# Patient Record
Sex: MALE | Race: WHITE | NOT HISPANIC OR LATINO | URBAN - METROPOLITAN AREA
[De-identification: names, ages, dates, MRNs, and addresses within clinical notes are randomized per-mention and may not be internally consistent; named-entity substitution may affect disease eponyms.]

---

## 2017-01-20 ENCOUNTER — EMERGENCY (EMERGENCY)
Facility: HOSPITAL | Age: 82
LOS: 1 days | Discharge: HOME | End: 2017-01-20
Attending: EMERGENCY MEDICINE | Admitting: EMERGENCY MEDICINE
Payer: MEDICARE

## 2017-01-20 VITALS
TEMPERATURE: 98 F | HEART RATE: 84 BPM | WEIGHT: 143.3 LBS | RESPIRATION RATE: 19 BRPM | OXYGEN SATURATION: 99 % | HEIGHT: 63 IN | SYSTOLIC BLOOD PRESSURE: 136 MMHG | DIASTOLIC BLOOD PRESSURE: 62 MMHG

## 2017-01-20 DIAGNOSIS — Z98.890 OTHER SPECIFIED POSTPROCEDURAL STATES: Chronic | ICD-10-CM

## 2017-01-20 DIAGNOSIS — H11.32 CONJUNCTIVAL HEMORRHAGE, LEFT EYE: ICD-10-CM

## 2017-01-20 DIAGNOSIS — Z95.4 PRESENCE OF OTHER HEART-VALVE REPLACEMENT: ICD-10-CM

## 2017-01-20 DIAGNOSIS — I10 ESSENTIAL (PRIMARY) HYPERTENSION: ICD-10-CM

## 2017-01-20 DIAGNOSIS — G30.9 ALZHEIMER'S DISEASE, UNSPECIFIED: ICD-10-CM

## 2017-01-20 DIAGNOSIS — Z95.810 PRESENCE OF AUTOMATIC (IMPLANTABLE) CARDIAC DEFIBRILLATOR: ICD-10-CM

## 2017-01-20 DIAGNOSIS — I50.9 HEART FAILURE, UNSPECIFIED: ICD-10-CM

## 2017-01-20 DIAGNOSIS — Z95.1 PRESENCE OF AORTOCORONARY BYPASS GRAFT: ICD-10-CM

## 2017-01-20 DIAGNOSIS — M31.6 OTHER GIANT CELL ARTERITIS: ICD-10-CM

## 2017-01-20 DIAGNOSIS — Z95.810 PRESENCE OF AUTOMATIC (IMPLANTABLE) CARDIAC DEFIBRILLATOR: Chronic | ICD-10-CM

## 2017-01-20 DIAGNOSIS — F02.80 DEMENTIA IN OTHER DISEASES CLASSIFIED ELSEWHERE, UNSPECIFIED SEVERITY, WITHOUT BEHAVIORAL DISTURBANCE, PSYCHOTIC DISTURBANCE, MOOD DISTURBANCE, AND ANXIETY: ICD-10-CM

## 2017-01-20 DIAGNOSIS — H57.8 OTHER SPECIFIED DISORDERS OF EYE AND ADNEXA: ICD-10-CM

## 2017-01-20 DIAGNOSIS — Z79.02 LONG TERM (CURRENT) USE OF ANTITHROMBOTICS/ANTIPLATELETS: ICD-10-CM

## 2017-01-20 DIAGNOSIS — Z95.2 PRESENCE OF PROSTHETIC HEART VALVE: Chronic | ICD-10-CM

## 2017-01-20 PROCEDURE — 99282 EMERGENCY DEPT VISIT SF MDM: CPT

## 2017-01-20 NOTE — ED ADULT NURSE NOTE - PMH
CHF (congestive heart failure)    HTN (hypertension) CHF (congestive heart failure)    Dementia    HTN (hypertension)    Temporal arteritis

## 2017-01-20 NOTE — ED ADULT NURSE NOTE - OBJECTIVE STATEMENT
Pt A&Ox4, ambulatory to ED c/o redness to left eye.  Per pt's wife, he had sudden onset of blood in left eye tonight.  Pt denies visual changes or pain.

## 2017-01-20 NOTE — ED PROVIDER NOTE - CHPI ED SYMPTOMS NEG
no eye lid swelling/no discharge/no photophobia/no foreign body/no purulent drainage/no drainage/no pain/no blurred vision/no double vision

## 2017-01-20 NOTE — ED ADULT NURSE NOTE - PSH
Aortic valve replaced    Cardiac defibrillator in place    H/O hernia repair    History of open heart surgery  triple bypass  History of prostate surgery

## 2017-01-20 NOTE — ED PROVIDER NOTE - OBJECTIVE STATEMENT
84 year Pmh of Dementia, alzheimers,HTN, temporal arteritis comes to the ER for painless red left eye. Patient cannot remember any trauma, sneezing or coughing but his wife noticed that his left eye was blood shot earlier today. No loss of vision, blurring of vision or any other complaints. Has not been on any blood thinners in the last 2 weeks.

## 2017-01-20 NOTE — ED PROVIDER NOTE - EYES, MLM
Conjunctiva red and swollen, no iritis noted, no blood in iris, occular pressure of 15 measured on tonopen.

## 2017-01-20 NOTE — ED ADULT NURSE NOTE - CHPI ED SYMPTOMS NEG
no vomiting/no loss of consciousness/no change in level of consciousness/no blurred vision/no syncope

## 2017-01-26 ENCOUNTER — EMERGENCY (EMERGENCY)
Facility: HOSPITAL | Age: 82
LOS: 1 days | Discharge: HOME | End: 2017-01-26
Attending: EMERGENCY MEDICINE | Admitting: EMERGENCY MEDICINE
Payer: MEDICARE

## 2017-01-26 VITALS
WEIGHT: 164.02 LBS | TEMPERATURE: 98 F | RESPIRATION RATE: 16 BRPM | DIASTOLIC BLOOD PRESSURE: 78 MMHG | OXYGEN SATURATION: 98 % | SYSTOLIC BLOOD PRESSURE: 160 MMHG | HEART RATE: 84 BPM

## 2017-01-26 VITALS
HEART RATE: 86 BPM | RESPIRATION RATE: 18 BRPM | OXYGEN SATURATION: 96 % | DIASTOLIC BLOOD PRESSURE: 70 MMHG | TEMPERATURE: 98 F | SYSTOLIC BLOOD PRESSURE: 125 MMHG

## 2017-01-26 DIAGNOSIS — I50.9 HEART FAILURE, UNSPECIFIED: ICD-10-CM

## 2017-01-26 DIAGNOSIS — Z98.890 OTHER SPECIFIED POSTPROCEDURAL STATES: Chronic | ICD-10-CM

## 2017-01-26 DIAGNOSIS — F03.90 UNSPECIFIED DEMENTIA, UNSPECIFIED SEVERITY, WITHOUT BEHAVIORAL DISTURBANCE, PSYCHOTIC DISTURBANCE, MOOD DISTURBANCE, AND ANXIETY: ICD-10-CM

## 2017-01-26 DIAGNOSIS — Z79.01 LONG TERM (CURRENT) USE OF ANTICOAGULANTS: ICD-10-CM

## 2017-01-26 DIAGNOSIS — Z95.2 PRESENCE OF PROSTHETIC HEART VALVE: Chronic | ICD-10-CM

## 2017-01-26 DIAGNOSIS — M12.9 ARTHROPATHY, UNSPECIFIED: ICD-10-CM

## 2017-01-26 DIAGNOSIS — Z98.890 OTHER SPECIFIED POSTPROCEDURAL STATES: ICD-10-CM

## 2017-01-26 DIAGNOSIS — I10 ESSENTIAL (PRIMARY) HYPERTENSION: ICD-10-CM

## 2017-01-26 DIAGNOSIS — Z95.810 PRESENCE OF AUTOMATIC (IMPLANTABLE) CARDIAC DEFIBRILLATOR: Chronic | ICD-10-CM

## 2017-01-26 PROCEDURE — 73110 X-RAY EXAM OF WRIST: CPT

## 2017-01-26 PROCEDURE — 73130 X-RAY EXAM OF HAND: CPT

## 2017-01-26 PROCEDURE — 96372 THER/PROPH/DIAG INJ SC/IM: CPT

## 2017-01-26 PROCEDURE — 99283 EMERGENCY DEPT VISIT LOW MDM: CPT | Mod: 25

## 2017-01-26 PROCEDURE — 99283 EMERGENCY DEPT VISIT LOW MDM: CPT

## 2017-01-26 PROCEDURE — 73110 X-RAY EXAM OF WRIST: CPT | Mod: 26,LT

## 2017-01-26 PROCEDURE — 73130 X-RAY EXAM OF HAND: CPT | Mod: 26,LT

## 2017-01-26 RX ORDER — MELOXICAM 15 MG/1
1 TABLET ORAL
Qty: 15 | Refills: 0 | OUTPATIENT
Start: 2017-01-26 | End: 2017-02-10

## 2017-01-26 RX ORDER — KETOROLAC TROMETHAMINE 30 MG/ML
15 SYRINGE (ML) INJECTION ONCE
Qty: 0 | Refills: 0 | Status: DISCONTINUED | OUTPATIENT
Start: 2017-01-26 | End: 2017-01-26

## 2017-01-26 RX ORDER — ACETAMINOPHEN 500 MG
650 TABLET ORAL ONCE
Qty: 0 | Refills: 0 | Status: COMPLETED | OUTPATIENT
Start: 2017-01-26 | End: 2017-01-26

## 2017-01-26 RX ADMIN — Medication 650 MILLIGRAM(S): at 17:19

## 2017-01-26 RX ADMIN — Medication 15 MILLIGRAM(S): at 18:35

## 2017-01-26 NOTE — ED PROVIDER NOTE - OBJECTIVE STATEMENT
Patient came into the ED with family [place] [facility] [place]. left-hand dominant. no known trauma. Patient has dementia, so forgets things but wife states when he falls he calls her for help, so low suspicion of fall.

## 2017-01-26 NOTE — ED PROVIDER NOTE - CONSTITUTIONAL, MLM
normal... Well appearing, well nourished, awake, alert, mild confusion at baseline. and in no apparent distress.

## 2017-01-26 NOTE — ED PROVIDER NOTE - LOCATION
wrist/+swelling left dorsum hand and wrist. +tenderness all metacarpals. FROM hand and wrist with pain. +radial pulse. nontender arm/elbow/shoulder./hand

## 2017-01-26 NOTE — ED PROVIDER NOTE - EYES, MLM
Clear bilaterally, pupils equal, round and reactive to light. left eye with medial chemosis (old and improving). EOMI.

## 2017-01-26 NOTE — ED ADULT NURSE NOTE - OBJECTIVE STATEMENT
Pt is C/O pain to left hand. Pt has Hx dementia, denies recent fall. Family states pt usually has to marcella for help getting up after falling, no recent fall that she is aware of

## 2017-01-30 PROBLEM — I10 ESSENTIAL (PRIMARY) HYPERTENSION: Chronic | Status: ACTIVE | Noted: 2017-01-20

## 2017-01-30 PROBLEM — F03.90 UNSPECIFIED DEMENTIA, UNSPECIFIED SEVERITY, WITHOUT BEHAVIORAL DISTURBANCE, PSYCHOTIC DISTURBANCE, MOOD DISTURBANCE, AND ANXIETY: Chronic | Status: ACTIVE | Noted: 2017-01-20

## 2017-01-30 PROBLEM — M31.6 OTHER GIANT CELL ARTERITIS: Chronic | Status: ACTIVE | Noted: 2017-01-20

## 2017-01-30 PROBLEM — I50.9 HEART FAILURE, UNSPECIFIED: Chronic | Status: ACTIVE | Noted: 2017-01-20

## 2018-01-01 ENCOUNTER — INPATIENT (INPATIENT)
Facility: HOSPITAL | Age: 83
LOS: 1 days | Discharge: HOME | DRG: 176 | End: 2018-01-03
Attending: INTERNAL MEDICINE | Admitting: INTERNAL MEDICINE
Payer: MEDICARE

## 2018-01-01 VITALS
TEMPERATURE: 97 F | HEART RATE: 97 BPM | OXYGEN SATURATION: 97 % | HEIGHT: 65 IN | DIASTOLIC BLOOD PRESSURE: 73 MMHG | WEIGHT: 134.92 LBS | RESPIRATION RATE: 16 BRPM | SYSTOLIC BLOOD PRESSURE: 153 MMHG

## 2018-01-01 DIAGNOSIS — R10.9 UNSPECIFIED ABDOMINAL PAIN: ICD-10-CM

## 2018-01-01 DIAGNOSIS — Z98.890 OTHER SPECIFIED POSTPROCEDURAL STATES: Chronic | ICD-10-CM

## 2018-01-01 DIAGNOSIS — I10 ESSENTIAL (PRIMARY) HYPERTENSION: ICD-10-CM

## 2018-01-01 DIAGNOSIS — F41.9 ANXIETY DISORDER, UNSPECIFIED: ICD-10-CM

## 2018-01-01 DIAGNOSIS — Z95.2 PRESENCE OF PROSTHETIC HEART VALVE: Chronic | ICD-10-CM

## 2018-01-01 DIAGNOSIS — I25.10 ATHEROSCLEROTIC HEART DISEASE OF NATIVE CORONARY ARTERY WITHOUT ANGINA PECTORIS: ICD-10-CM

## 2018-01-01 DIAGNOSIS — Z95.810 PRESENCE OF AUTOMATIC (IMPLANTABLE) CARDIAC DEFIBRILLATOR: Chronic | ICD-10-CM

## 2018-01-01 DIAGNOSIS — H26.9 UNSPECIFIED CATARACT: Chronic | ICD-10-CM

## 2018-01-01 DIAGNOSIS — I26.99 OTHER PULMONARY EMBOLISM WITHOUT ACUTE COR PULMONALE: ICD-10-CM

## 2018-01-01 DIAGNOSIS — Z29.9 ENCOUNTER FOR PROPHYLACTIC MEASURES, UNSPECIFIED: ICD-10-CM

## 2018-01-01 DIAGNOSIS — I50.9 HEART FAILURE, UNSPECIFIED: ICD-10-CM

## 2018-01-01 DIAGNOSIS — F03.90 UNSPECIFIED DEMENTIA WITHOUT BEHAVIORAL DISTURBANCE: ICD-10-CM

## 2018-01-01 LAB
ALBUMIN SERPL ELPH-MCNC: 3.2 G/DL — LOW (ref 3.3–5)
ALP SERPL-CCNC: 106 U/L — SIGNIFICANT CHANGE UP (ref 40–120)
ALT FLD-CCNC: 15 U/L — SIGNIFICANT CHANGE UP (ref 12–78)
AMYLASE P1 CFR SERPL: 65 U/L — SIGNIFICANT CHANGE UP (ref 25–115)
ANION GAP SERPL CALC-SCNC: 8 MMOL/L — SIGNIFICANT CHANGE UP (ref 5–17)
APTT BLD: 30.7 SEC — SIGNIFICANT CHANGE UP (ref 27.5–37.4)
AST SERPL-CCNC: 20 U/L — SIGNIFICANT CHANGE UP (ref 15–37)
BASOPHILS # BLD AUTO: 0.1 K/UL — SIGNIFICANT CHANGE UP (ref 0–0.2)
BASOPHILS NFR BLD AUTO: 1.2 % — SIGNIFICANT CHANGE UP (ref 0–2)
BILIRUB SERPL-MCNC: 1.1 MG/DL — SIGNIFICANT CHANGE UP (ref 0.2–1.2)
BUN SERPL-MCNC: 13 MG/DL — SIGNIFICANT CHANGE UP (ref 7–23)
CALCIUM SERPL-MCNC: 8.3 MG/DL — LOW (ref 8.5–10.1)
CHLORIDE SERPL-SCNC: 107 MMOL/L — SIGNIFICANT CHANGE UP (ref 96–108)
CK MB BLD-MCNC: 1.2 % — SIGNIFICANT CHANGE UP (ref 0–3.5)
CK MB BLD-MCNC: 1.5 % — SIGNIFICANT CHANGE UP (ref 0–3.5)
CK MB CFR SERPL CALC: 0.9 NG/ML — SIGNIFICANT CHANGE UP (ref 0–3.6)
CK MB CFR SERPL CALC: 0.9 NG/ML — SIGNIFICANT CHANGE UP (ref 0–3.6)
CK SERPL-CCNC: 59 U/L — SIGNIFICANT CHANGE UP (ref 26–308)
CK SERPL-CCNC: 75 U/L — SIGNIFICANT CHANGE UP (ref 26–308)
CO2 SERPL-SCNC: 28 MMOL/L — SIGNIFICANT CHANGE UP (ref 22–31)
CREAT SERPL-MCNC: 1.2 MG/DL — SIGNIFICANT CHANGE UP (ref 0.5–1.3)
EOSINOPHIL # BLD AUTO: 0 K/UL — SIGNIFICANT CHANGE UP (ref 0–0.5)
EOSINOPHIL NFR BLD AUTO: 0.2 % — SIGNIFICANT CHANGE UP (ref 0–6)
GLUCOSE SERPL-MCNC: 113 MG/DL — HIGH (ref 70–99)
HCT VFR BLD CALC: 36.7 % — LOW (ref 39–50)
HGB BLD-MCNC: 11.9 G/DL — LOW (ref 13–17)
INR BLD: 1.16 RATIO — SIGNIFICANT CHANGE UP (ref 0.88–1.16)
LIDOCAIN IGE QN: 94 U/L — SIGNIFICANT CHANGE UP (ref 73–393)
LYMPHOCYTES # BLD AUTO: 0.5 K/UL — LOW (ref 1–3.3)
LYMPHOCYTES # BLD AUTO: 6.7 % — LOW (ref 13–44)
MCHC RBC-ENTMCNC: 28.9 PG — SIGNIFICANT CHANGE UP (ref 27–34)
MCHC RBC-ENTMCNC: 32.3 GM/DL — SIGNIFICANT CHANGE UP (ref 32–36)
MCV RBC AUTO: 89.5 FL — SIGNIFICANT CHANGE UP (ref 80–100)
MONOCYTES # BLD AUTO: 0.9 K/UL — SIGNIFICANT CHANGE UP (ref 0–0.9)
MONOCYTES NFR BLD AUTO: 12.6 % — HIGH (ref 1–9)
NEUTROPHILS # BLD AUTO: 5.5 K/UL — SIGNIFICANT CHANGE UP (ref 1.8–7.4)
NEUTROPHILS NFR BLD AUTO: 79.2 % — HIGH (ref 43–77)
PLATELET # BLD AUTO: 122 K/UL — LOW (ref 150–400)
POTASSIUM SERPL-MCNC: 3.8 MMOL/L — SIGNIFICANT CHANGE UP (ref 3.5–5.3)
POTASSIUM SERPL-SCNC: 3.8 MMOL/L — SIGNIFICANT CHANGE UP (ref 3.5–5.3)
PROT SERPL-MCNC: 6.8 G/DL — SIGNIFICANT CHANGE UP (ref 6–8.3)
PROTHROM AB SERPL-ACNC: 12.7 SEC — SIGNIFICANT CHANGE UP (ref 9.8–12.7)
RBC # BLD: 4.1 M/UL — LOW (ref 4.2–5.8)
RBC # FLD: 13.6 % — SIGNIFICANT CHANGE UP (ref 10.3–14.5)
SODIUM SERPL-SCNC: 143 MMOL/L — SIGNIFICANT CHANGE UP (ref 135–145)
TROPONIN I SERPL-MCNC: 0.06 NG/ML — HIGH (ref 0.01–0.04)
TROPONIN I SERPL-MCNC: 0.09 NG/ML — HIGH (ref 0.01–0.04)
WBC # BLD: 7 K/UL — SIGNIFICANT CHANGE UP (ref 3.8–10.5)
WBC # FLD AUTO: 7 K/UL — SIGNIFICANT CHANGE UP (ref 3.8–10.5)

## 2018-01-01 PROCEDURE — 99223 1ST HOSP IP/OBS HIGH 75: CPT

## 2018-01-01 PROCEDURE — 74177 CT ABD & PELVIS W/CONTRAST: CPT | Mod: 26

## 2018-01-01 PROCEDURE — 93970 EXTREMITY STUDY: CPT | Mod: 26

## 2018-01-01 PROCEDURE — 71275 CT ANGIOGRAPHY CHEST: CPT | Mod: 26

## 2018-01-01 PROCEDURE — 74019 RADEX ABDOMEN 2 VIEWS: CPT | Mod: 26

## 2018-01-01 PROCEDURE — 93010 ELECTROCARDIOGRAM REPORT: CPT

## 2018-01-01 PROCEDURE — 71045 X-RAY EXAM CHEST 1 VIEW: CPT | Mod: 26

## 2018-01-01 PROCEDURE — 99285 EMERGENCY DEPT VISIT HI MDM: CPT

## 2018-01-01 RX ORDER — OXYBUTYNIN CHLORIDE 5 MG
10 TABLET ORAL DAILY
Qty: 0 | Refills: 0 | Status: DISCONTINUED | OUTPATIENT
Start: 2018-01-01 | End: 2018-01-03

## 2018-01-01 RX ORDER — IOHEXOL 300 MG/ML
30 INJECTION, SOLUTION INTRAVENOUS ONCE
Qty: 0 | Refills: 0 | Status: COMPLETED | OUTPATIENT
Start: 2018-01-01 | End: 2018-01-01

## 2018-01-01 RX ORDER — ASPIRIN/CALCIUM CARB/MAGNESIUM 324 MG
81 TABLET ORAL DAILY
Qty: 0 | Refills: 0 | Status: DISCONTINUED | OUTPATIENT
Start: 2018-01-01 | End: 2018-01-01

## 2018-01-01 RX ORDER — GABAPENTIN 400 MG/1
0 CAPSULE ORAL
Qty: 0 | Refills: 0 | COMMUNITY

## 2018-01-01 RX ORDER — DONEPEZIL HYDROCHLORIDE 10 MG/1
10 TABLET, FILM COATED ORAL AT BEDTIME
Qty: 0 | Refills: 0 | Status: DISCONTINUED | OUTPATIENT
Start: 2018-01-01 | End: 2018-01-03

## 2018-01-01 RX ORDER — SODIUM CHLORIDE 9 MG/ML
1000 INJECTION INTRAMUSCULAR; INTRAVENOUS; SUBCUTANEOUS ONCE
Qty: 0 | Refills: 0 | Status: COMPLETED | OUTPATIENT
Start: 2018-01-01 | End: 2018-01-01

## 2018-01-01 RX ORDER — ATENOLOL 25 MG/1
25 TABLET ORAL
Qty: 0 | Refills: 0 | Status: DISCONTINUED | OUTPATIENT
Start: 2018-01-01 | End: 2018-01-02

## 2018-01-01 RX ORDER — MORPHINE SULFATE 50 MG/1
2 CAPSULE, EXTENDED RELEASE ORAL ONCE
Qty: 0 | Refills: 0 | Status: DISCONTINUED | OUTPATIENT
Start: 2018-01-01 | End: 2018-01-01

## 2018-01-01 RX ORDER — SERTRALINE 25 MG/1
100 TABLET, FILM COATED ORAL DAILY
Qty: 0 | Refills: 0 | Status: DISCONTINUED | OUTPATIENT
Start: 2018-01-01 | End: 2018-01-03

## 2018-01-01 RX ORDER — FUROSEMIDE 40 MG
40 TABLET ORAL DAILY
Qty: 0 | Refills: 0 | Status: DISCONTINUED | OUTPATIENT
Start: 2018-01-01 | End: 2018-01-02

## 2018-01-01 RX ORDER — ISOSORBIDE DINITRATE 5 MG/1
0 TABLET ORAL
Qty: 0 | Refills: 0 | COMMUNITY

## 2018-01-01 RX ORDER — ASPIRIN/CALCIUM CARB/MAGNESIUM 324 MG
81 TABLET ORAL DAILY
Qty: 0 | Refills: 0 | Status: DISCONTINUED | OUTPATIENT
Start: 2018-01-01 | End: 2018-01-03

## 2018-01-01 RX ORDER — ONDANSETRON 8 MG/1
4 TABLET, FILM COATED ORAL ONCE
Qty: 0 | Refills: 0 | Status: COMPLETED | OUTPATIENT
Start: 2018-01-01 | End: 2018-01-01

## 2018-01-01 RX ORDER — DONEPEZIL HYDROCHLORIDE 10 MG/1
0 TABLET, FILM COATED ORAL
Qty: 0 | Refills: 0 | COMMUNITY

## 2018-01-01 RX ORDER — MEMANTINE HYDROCHLORIDE 10 MG/1
0 TABLET ORAL
Qty: 0 | Refills: 0 | COMMUNITY

## 2018-01-01 RX ORDER — ATORVASTATIN CALCIUM 80 MG/1
40 TABLET, FILM COATED ORAL AT BEDTIME
Qty: 0 | Refills: 0 | Status: DISCONTINUED | OUTPATIENT
Start: 2018-01-01 | End: 2018-01-03

## 2018-01-01 RX ORDER — ATENOLOL 25 MG/1
0 TABLET ORAL
Qty: 0 | Refills: 0 | COMMUNITY

## 2018-01-01 RX ORDER — CLOPIDOGREL BISULFATE 75 MG/1
1 TABLET, FILM COATED ORAL
Qty: 0 | Refills: 0 | COMMUNITY

## 2018-01-01 RX ORDER — ISOSORBIDE MONONITRATE 60 MG/1
120 TABLET, EXTENDED RELEASE ORAL
Qty: 0 | Refills: 0 | Status: DISCONTINUED | OUTPATIENT
Start: 2018-01-01 | End: 2018-01-02

## 2018-01-01 RX ORDER — POTASSIUM CHLORIDE 20 MEQ
0 PACKET (EA) ORAL
Qty: 0 | Refills: 0 | COMMUNITY

## 2018-01-01 RX ORDER — SERTRALINE 25 MG/1
0 TABLET, FILM COATED ORAL
Qty: 0 | Refills: 0 | COMMUNITY

## 2018-01-01 RX ORDER — ASPIRIN/CALCIUM CARB/MAGNESIUM 324 MG
325 TABLET ORAL ONCE
Qty: 0 | Refills: 0 | Status: COMPLETED | OUTPATIENT
Start: 2018-01-01 | End: 2018-01-01

## 2018-01-01 RX ORDER — ENOXAPARIN SODIUM 100 MG/ML
60 INJECTION SUBCUTANEOUS
Qty: 0 | Refills: 0 | Status: DISCONTINUED | OUTPATIENT
Start: 2018-01-02 | End: 2018-01-03

## 2018-01-01 RX ORDER — POTASSIUM CHLORIDE 20 MEQ
20 PACKET (EA) ORAL DAILY
Qty: 0 | Refills: 0 | Status: DISCONTINUED | OUTPATIENT
Start: 2018-01-01 | End: 2018-01-03

## 2018-01-01 RX ORDER — CHOLECALCIFEROL (VITAMIN D3) 125 MCG
1000 CAPSULE ORAL DAILY
Qty: 0 | Refills: 0 | Status: DISCONTINUED | OUTPATIENT
Start: 2018-01-01 | End: 2018-01-03

## 2018-01-01 RX ORDER — ENOXAPARIN SODIUM 100 MG/ML
60 INJECTION SUBCUTANEOUS ONCE
Qty: 0 | Refills: 0 | Status: COMPLETED | OUTPATIENT
Start: 2018-01-01 | End: 2018-01-01

## 2018-01-01 RX ORDER — ATORVASTATIN CALCIUM 80 MG/1
0 TABLET, FILM COATED ORAL
Qty: 0 | Refills: 0 | COMMUNITY

## 2018-01-01 RX ORDER — EZETIMIBE 10 MG/1
0 TABLET ORAL
Qty: 0 | Refills: 0 | COMMUNITY

## 2018-01-01 RX ORDER — FUROSEMIDE 40 MG
0 TABLET ORAL
Qty: 0 | Refills: 0 | COMMUNITY

## 2018-01-01 RX ADMIN — ONDANSETRON 4 MILLIGRAM(S): 8 TABLET, FILM COATED ORAL at 11:48

## 2018-01-01 RX ADMIN — Medication 325 MILLIGRAM(S): at 13:06

## 2018-01-01 RX ADMIN — SODIUM CHLORIDE 1000 MILLILITER(S): 9 INJECTION INTRAMUSCULAR; INTRAVENOUS; SUBCUTANEOUS at 11:49

## 2018-01-01 RX ADMIN — MORPHINE SULFATE 2 MILLIGRAM(S): 50 CAPSULE, EXTENDED RELEASE ORAL at 11:48

## 2018-01-01 RX ADMIN — ENOXAPARIN SODIUM 60 MILLIGRAM(S): 100 INJECTION SUBCUTANEOUS at 16:09

## 2018-01-01 RX ADMIN — MORPHINE SULFATE 2 MILLIGRAM(S): 50 CAPSULE, EXTENDED RELEASE ORAL at 11:57

## 2018-01-01 RX ADMIN — IOHEXOL 30 MILLILITER(S): 300 INJECTION, SOLUTION INTRAVENOUS at 11:48

## 2018-01-01 NOTE — CONSULT NOTE ADULT - ASSESSMENT
This is an 85M with PMH of CAD s/p CABGx 1 v in 2009, mm PCI procedures, CHF (EF?), ICD,  HTN, aortic stenosis s/p bioprosthetic aortic valve replacement, carotid disease s/p b/l CEA, dementia presents with RUQ pain since this morning at 2 AM, incidentally found to have RLL PE:    - Admit to telemetry  - Trend cardiac enzymes  - Needs 2 D  echo to assess RV and LV function  - Full dose Lovenox at 60 BID  - Continue aspirin 81 qd  - D/c Brillinta  - Continue atenolol 25 BID  - Lipitor 40 hs  - Continue Lasix 40 qd  - Continue Imdur 120 qd  - Monitor and replete lytes  - Supplemental oxygen as needed  - WOrk up of abdominal pain per primary team  - To follow

## 2018-01-01 NOTE — H&P ADULT - HISTORY OF PRESENT ILLNESS
85M with PMH of presents to ED for abdominal pain    In ED, vitals stable. Labs significant for H/H 11.9/36.7. D dimer 664. Troponin elevated at 0.062. CXR negative. Xray abdomen shows no obstruction or free air. CTA chest and CT abdomen/pelvis shows RLL PE and cholelithiasis. Given asa and dose of full dose lovenox. 85M with PMH of CAD s/p CABG, CHF (EF?), HTN, aortic stenosis s/p replacement, dementia presents to ED for abdominal pain since this morning at 2 AM. History given by family who is at bedside as pt is poor historian.     In ED, vitals stable. Labs significant for H/H 11.9/36.7. D dimer 664. Troponin elevated at 0.062. CXR negative. Xray abdomen shows no obstruction or free air. CTA chest and CT abdomen/pelvis shows RLL PE and cholelithiasis. Given asa and started on full dose lovenox. LE dopplers pending. to ED for abdominal pain since this morning at 2 AM. History given by family who is at bedside as pt is poor historian.     In ED, vitals stable. Labs significant for H/H 11.9/36.7. D dimer 664. Troponin elevated at 0.062. EKG shows sinus with 1 degree av block. CXR negative. Xray abdomen shows no obstruction or free air. CTA chest and CT abdomen/pelvis shows RLL PE and cholelithiasis. Given asa and started on full dose lovenox. LE dopplers pending. 85M with PMH of CAD s/p CABG, CHF (EF?), HTN, aortic stenosis s/p replacement, dementia presents with RUQ pain since this morning at 2 AM. History given by family who is at bedside as pt is poor historian. States that pt was experience intermittent stabbing pain in RUQ that caused him to wince in pain. No radiation of pain to back or sides. Pt states he also had chest pain when taking deep breaths. Denies fever, chills, SOB, n/v/d.     In ED, vitals stable. Labs significant for H/H 11.9/36.7. D dimer 664. Troponin elevated at 0.062. EKG shows sinus with 1 degree av block. CXR negative. Xray abdomen shows no obstruction or free air. CTA chest and CT abdomen/pelvis shows RLL PE and cholelithiasis. Given asa and started on full dose lovenox. LE dopplers pending. 85M with PMH of CAD s/p CABG, CHF (EF?), HTN, aortic stenosis s/p replacement, dementia presents with RUQ pain since this morning at 2 AM. History given by family who is at bedside as pt is poor historian. States that pt was experience intermittent stabbing pain in RUQ that caused him to wince in pain. No radiation of pain to back or sides. Pt states he also had chest pain when taking deep breaths. Denies fever, chills, SOB, n/v/d. Of note, pt family states that pt has not been taking his meds for a long time. Only takes lasix for swelling in legs.     In ED, vitals stable. Labs significant for H/H 11.9/36.7. D dimer 664. Troponin elevated at 0.062. EKG shows sinus with 1 degree av block. CXR negative. Xray abdomen shows no obstruction or free air. CTA chest and CT abdomen/pelvis shows RLL PE and cholelithiasis. Given asa and started on full dose lovenox. LE dopplers pending.

## 2018-01-01 NOTE — H&P ADULT - NSHPSOCIALHISTORY_GEN_ALL_CORE
Marital Status:  Lives with:   Occupation:     Tobacco Use:   Alcohol Use:  Substance Use:    Immunization Hx  [  ] Flu shot                          Date:   [  ] Pneumonia shot           Date:  [  ] Tetanus                          Date:    [  ] Advanced Directives: [   ] declined   [  ] health care proxy: Marital Status:   Lives with: Spouse  Occupation: Retired    Tobacco Use: Former cigar smoker   Alcohol Use: Social drinker  Substance Use: Denies    Immunization Hx  [ X ] Flu shot                     Date: 2017  [ X ] Pneumonia shot         Date: in last 5 yrs  [  ] Tetanus                       Date:    [  ] Advanced Directives: [   ] declined   [  ] health care proxy:

## 2018-01-01 NOTE — H&P ADULT - NSHPPHYSICALEXAM_GEN_ALL_CORE
Vital Signs Last 24 Hrs  T(C): 36.1 (01 Jan 2018 10:58), Max: 36.1 (01 Jan 2018 10:58)  T(F): 97 (01 Jan 2018 10:58), Max: 97 (01 Jan 2018 10:58)  HR: 82 (01 Jan 2018 15:00) (82 - 97)  BP: 130/75 (01 Jan 2018 15:00) (130/75 - 153/73)  RR: 22 (01 Jan 2018 15:00) (16 - 22)  SpO2: 98% (01 Jan 2018 15:00) (97% - 98%) Vital Signs Last 24 Hrs  T(C): 36.1 (01 Jan 2018 10:58), Max: 36.1 (01 Jan 2018 10:58)  T(F): 97 (01 Jan 2018 10:58), Max: 97 (01 Jan 2018 10:58)  HR: 82 (01 Jan 2018 15:00) (82 - 97)  BP: 130/75 (01 Jan 2018 15:00) (130/75 - 153/73)  RR: 22 (01 Jan 2018 15:00) (16 - 22)  SpO2: 98% (01 Jan 2018 15:00) (97% - 98%)    PHYSICAL EXAM:  GENERAL: NAD, well-developed, well nourished  HEAD:  NC/AC  EYES: EOMI, conjunctiva and sclera clear  NECK: Supple, No JVD  CHEST/LUNG: CTAB. No cough, wheeze, rales.   HEART: Regular rate and rhythm. No murmurs, rubs, or gallops.   ABDOMEN: Soft, Nontender, Nondistended. Bowel sounds present  EXTREMITIES:  2+ Peripheral Pulses, No clubbing, cyanosis, or edema  PSYCH: AAOx3  NEUROLOGY: non-focal  SKIN: No rashes or lesions

## 2018-01-01 NOTE — ED PROVIDER NOTE - PROGRESS NOTE DETAILS
spoke with Dr Gabriel, results discussed, troponin .062, case discussed, will be in to see patient as per Dr Gabriel, 2nd set of enzymes in 4 hours, informed D dimer was added. as per radiologist Dr Thapa, rll pe, scarring vs opacities rll, gall stones.  spoke with Dr Perlman, case discussed, results discussed, will admit patient, tele, advised call admitting resident.  spoke with admitting resident will be in to see patient.  wife and patient made aware.

## 2018-01-01 NOTE — H&P ADULT - PMH
CHF (congestive heart failure)    Dementia    HTN (hypertension)    Temporal arteritis Anxiety    Aortic stenosis    CAD (coronary artery disease)    CHF (congestive heart failure)    Dementia    HTN (hypertension)    Temporal arteritis

## 2018-01-01 NOTE — H&P ADULT - NSHPREVIEWOFSYSTEMS_GEN_ALL_CORE
CONSTITUTIONAL:  No fever, chills,  weight loss, weakness or fatigue.  HEENT:  Eyes:  No visual loss, blurred vision, diplopia or yellow sclerae.   Ears, Nose, Throat:  No hearing loss, congestion, runny nose or dysphagia.  CARDIOVASCULAR:  No chest pain, chest pressure or chest discomfort. No palpitations or edema.  RESPIRATORY:  No dyspnea, cough, congestion, wheeze.   GASTROINTESTINAL:  No abdominal pain, nausea, vomiting or diarrhea.   GENITOURINARY:  No dysuria, urinary frequency or hematuria.   NEUROLOGICAL:  No headache, dizziness, syncope, paralysis, ataxia, numbness or tingling in the extremities. No change in bowel or bladder control.  MUSCULOSKELETAL:  No muscle, back pain, joint pain or stiffness.  SKIN: No itch, rash, lesions.   HEMATOLOGIC:  No anemia, bleeding or bruising.  LYMPHATICS:  No enlarged nodes. No history of splenectomy.  PSYCHIATRIC:  No history of depression or anxiety.  ENDOCRINOLOGIC:  No reports of sweating, cold or heat intolerance. No polyuria or polydipsia. CONSTITUTIONAL:  No fever, chills, weight loss, weakness or fatigue.  HEENT:  Eyes:  No visual loss, blurred vision, diplopia or yellow sclerae.   Ears, Nose, Throat:  No hearing loss, congestion, runny nose or dysphagia.  CARDIOVASCULAR: Chest pain with deep breath. No palpitations or edema.  RESPIRATORY:  No dyspnea, cough, congestion, wheeze.   GASTROINTESTINAL: Abdominal pain. No nausea, vomiting or diarrhea.   GENITOURINARY:  No dysuria, urinary frequency or hematuria.   NEUROLOGICAL:  No headache, dizziness, syncope, paralysis, ataxia, numbness or tingling in the extremities.  MUSCULOSKELETAL:  No muscle, back pain, joint pain or stiffness.  SKIN: No itch, rash, lesions.   HEMATOLOGIC:  No anemia, bleeding or bruising.

## 2018-01-01 NOTE — H&P ADULT - ASSESSMENT
85M with PMH of CAD s/p CABG, CHF (EF?), HTN, aortic stenosis s/p replacement, dementia presents with RUQ pain and admitted for RLL PE.

## 2018-01-01 NOTE — ED PROVIDER NOTE - OBJECTIVE STATEMENT
86 yo male brought to ed by wife presents with sharp abdominal pain that began suddenly last night,  worse with deep breathing, wife states patient has mild dementia. states no fever, no chills no vomiting.  wife does not remember if he has his gallbladder or appendix.  wife states his urine output and bowel movements are normal.  lizeth has hx of defibrillator, chf, htn.  PMd Dr Muir 84 yo male brought to ed by wife presents with sharp abdominal pain that began suddenly last night,  worse with deep breathing, wife states patient has mild dementia. states no fever, no chills no vomiting.  wife does not remember if he has his gallbladder or appendix.  wife states his urine output and bowel movements are normal, last bowel movement yesterday, did not have anything to eat today.  cyndit has hx of defibrillator, chf, htn.   PMd Dr Muir

## 2018-01-01 NOTE — ED PROVIDER NOTE - CARE PLAN
Principal Discharge DX:	Abdominal pain, unspecified abdominal location Principal Discharge DX:	Elevated troponin  Secondary Diagnosis:	Abdominal pain, unspecified abdominal location Principal Discharge DX:	Pulmonary embolism  Secondary Diagnosis:	Abdominal pain, unspecified abdominal location

## 2018-01-01 NOTE — H&P ADULT - PROBLEM SELECTOR PLAN 8
IMPROVE VTE Individual Risk Assessment          RISK                                                          Points  [  ] Previous VTE                                                3  [  ] Thrombophilia                                            2  [  ] Lower limb paralysis                                  2        (unable to hold up >15 seconds)    [  ] Current Cancer                                            2         (within 6 months)  [  ] Immobilization > 24 hrs                             1  [  ] ICU/CCU stay > 24 hours                           1  [ X ] Age > 60                                                        1    IMPROVE VTE Score: 1    VTE ppx: On full dose lovenox for PE

## 2018-01-01 NOTE — H&P ADULT - PSH
Aortic valve replaced    Cardiac defibrillator in place    H/O hernia repair    History of open heart surgery  triple bypass  History of prostate surgery Aortic valve replaced    Bilateral cataracts    Cardiac defibrillator in place    H/O hernia repair    History of open heart surgery  triple bypass  History of prostate surgery

## 2018-01-01 NOTE — ED ADULT NURSE NOTE - HARM RISK FACTORS
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 Pt reports that she was just discharged 2 days ago from [facility] (from the ER?) with pt with same presentation with pt receiving systemic steroids and was discharged. Pt notes a globus sensation and is afraid of choking with these episodes. Pt is concerned when examiner corroborated the pt's hx and med list w/ pt's spouse that her spouse would ascribe everything to anxiety. Pt is requesting all medications to be IV--including benzodiazepine. No suicidal or homicidal ideation. No anhedonia, but pt anxious.
yes

## 2018-01-01 NOTE — ED PROVIDER NOTE - ATTENDING CONTRIBUTION TO CARE
Sharp LUQ abdominal pain in this elderly male. Exam revealed elderly male in mild distress with soft but slightly tender LUQ of abdomen and clear lungs and normal heart sounds. I agree with plan and management outlined by PA

## 2018-01-01 NOTE — CONSULT NOTE ADULT - SUBJECTIVE AND OBJECTIVE BOX
[facility] - Nadeen Sneed, Dayana, Violeta, Harvey, Curtis Giron  Office Number: [phone]    Initial Consult Note    CHIEF COMPLAINT: Patient is a 85y old  Male who presents with a chief complaint of Abdominal pain (01 Jan 2018 16:19)      HPI:  This is an 85M with PMH of CAD s/p CABGx 1 v in 2009, mm PCI procedures, CHF (EF?), ICD,  HTN, aortic stenosis s/p bioprosthetic aortic valve replacement, carotid disease s/p b/l CEA, dementia presents with RUQ pain since this morning at 2 AM. History given by family who is at bedside as pt is poor historian. States that pt was experience intermittent stabbing pain in RUQ that caused him to wince in pain. No radiation of pain to back or sides. Pt states he also had chest pain when taking deep breaths. Denies fever, chills, SOB, n/v/d.     In ED, vitals stable. Labs significant for H/H 11.9/36.7. D dimer 664. Troponin elevated at 0.062. EKG shows sinus with 1 degree av block. CXR negative. Xray abdomen shows no obstruction or free air. CTA chest and CT abdomen/pelvis shows RLL PE and cholelithiasis. Given asa and started on full dose lovenox. LE dopplers pending. (01 Jan 2018 16:19)      PAST MEDICAL & SURGICAL HISTORY:  Aortic stenosis  CAD (coronary artery disease)  Anxiety  Temporal arteritis  Dementia  CHF (congestive heart failure)  HTN (hypertension)  Bilateral cataracts  History of prostate surgery  Aortic valve replaced  Cardiac defibrillator in place  H/O hernia repair  History of open heart surgery: triple bypass      SOCIAL HISTORY:  No tobacco, ethanol, or drug abuse.    FAMILY HISTORY:  No pertinent family history in first degree relatives    No family history of acute MI or sudden cardiac death.    MEDICATIONS  (STANDING):  aspirin  chewable 81 milliGRAM(s) Oral daily  ATENolol  Tablet 25 milliGRAM(s) Oral two times a day  atorvastatin 40 milliGRAM(s) Oral at bedtime  cholecalciferol 1000 Unit(s) Oral daily  donepezil 10 milliGRAM(s) Oral at bedtime  enoxaparin Injectable 60 milliGRAM(s) SubCutaneous two times a day  furosemide    Tablet 40 milliGRAM(s) Oral daily  isosorbide   mononitrate ER Tablet (IMDUR) 120 milliGRAM(s) Oral <User Schedule>  oxybutynin 10 milliGRAM(s) Oral daily  potassium chloride    Tablet ER 20 milliEquivalent(s) Oral daily  sertraline 100 milliGRAM(s) Oral daily        Allergies    No Known Allergies        REVIEW OF SYSTEMS:    All other review of systems is negative unless indicated above    VITAL SIGNS:   Vital Signs Last 24 Hrs  T(C): 36.6 (01 Jan 2018 15:00), Max: 36.6 (01 Jan 2018 15:00)  T(F): 97.8 (01 Jan 2018 15:00), Max: 97.8 (01 Jan 2018 15:00)  HR: 82 (01 Jan 2018 15:00) (82 - 97)  BP: 130/75 (01 Jan 2018 15:00) (130/75 - 153/73)  BP(mean): --  RR: 22 (01 Jan 2018 15:00) (16 - 22)  SpO2: 98% (01 Jan 2018 15:00) (97% - 98%)    I&O's Summary      On Exam:    Constitutional: NAD, alert and awake, but confused  Lungs:  Non-labored, breath sounds are clear bilaterally, No wheezing, rales or rhonchi  Cardiovascular: RRR.  S1 and S2 positive.  1/6 systolic murmur  Gastrointestinal: Bowel Sounds present, soft, nontender.   Lymph: minimal LE peripheral edema. No cervical lymphadenopathy.  Neurological: Alert, no focal deficits  Skin: No rashes or ulcers   Psych:  Mood & affect appropriate.    LABS: All Labs Reviewed:                        11.9   7.0   )-----------( 122      ( 01 Jan 2018 11:52 )             36.7     01 Jan 2018 12:21    143    |  107    |  13     ----------------------------<  113    3.8     |  28     |  1.20     Ca    8.3        01 Jan 2018 12:21    TPro  6.8    /  Alb  3.2    /  TBili  1.1    /  DBili  x      /  AST  20     /  ALT  15     /  AlkPhos  106    01 Jan 2018 12:21    PT/INR - ( 01 Jan 2018 11:52 )   PT: 12.7 sec;   INR: 1.16 ratio         PTT - ( 01 Jan 2018 11:52 )  PTT:30.7 sec  CARDIAC MARKERS ( 01 Jan 2018 12:21 )  .062 ng/mL / x     / 59 U/L / x     / 0.9 ng/mL      Blood Culture:         RADIOLOGY:    < from: CT Abdomen and Pelvis w/ Oral Cont and w/ IV Cont (01.01.18 @ 15:01) >    EXAM:  CT ABDOMEN AND PELVIS OC IC                          EXAM:  CT ANGIO CHEST (W)AW IC                            PROCEDURE DATE:  01/01/2018          INTERPRETATION:  CLINICAL INFORMATION: Chest pain, abdominal pain    COMPARISON: None    PROCEDURE:   CT Angiography of the Chest was performed followed by portal venous phase   imaging of the Abdomen and Pelvis.  95 ml of Omnipaque 350 was injected intravenously. 5 ml were discarded.  Sagittal and coronal reformats were performed as well as 3D (MIP)   reconstructions.    FINDINGS:    CHEST:     LUNGS AND LARGE AIRWAYS: Patent central airways. Right lower lobe   opacities. Bilateral dependent atelectasis.  PLEURA: Trace right pleural effusion.  VESSELS: Atherosclerotic changes of thoracicaorta and coronary arteries.   Pulmonary arterial filling defects identified within the right lower lobe.  HEART: Heart size is normal. Aortic valve prosthesis. No pericardial   effusion.  MEDIASTINUM AND EMMY: No lymphadenopathy.  CHEST WALL AND LOWER NECK: Status post sternotomy. Left anterior chest   wall cardiac conduction device with lead to the heart.    ABDOMEN AND PELVIS:    LIVER: Within normal limits.  BILE DUCTS: Normal caliber.  GALLBLADDER: Cholelithiasis.  SPLEEN: Within normal limits.  PANCREAS: Atrophic.  ADRENALS: Mild thickening.  KIDNEYS/URETERS: Subcentimeter hypodense renal lesions, too small to   further characterize. Renal cysts measuring up to 2.2 cm.    BLADDER: Within normal limits.  REPRODUCTIVE ORGANS: The prostateis enlarged.    BOWEL: No bowel obstruction. Appendix not visualized. Colonic   diverticulosis.  PERITONEUM: No ascites.  VESSELS:  Atherosclerotic changes.  RETROPERITONEUM: No lymphadenopathy.    ABDOMINAL WALL: Tiny fat-containing umbilical hernia.  BONES: Shoulder, hip, and spine degenerative changes.    IMPRESSION:     Positive for pulmonary emboli in the right lower lobe.  Right lower lobe opacities, which may represent infarct and/or pneumonia.  Cholelithiasis.    Findings were discussed with Dr. Martinez by telephone on 1/1/2018 at   1520 hours.                HIMA SOLIS M.D., ATTENDING RADIOLOGIST  This document has been electronically signed. Jan 1 2018  3:23PM              < end of copied text >      EKG:  SInus rhythm.  1st degree AVB.  NSST changes.

## 2018-01-01 NOTE — H&P ADULT - PROBLEM SELECTOR PLAN 1
CTA shows RLL PE.   Admit to telemetry  - Full dose lovenox   - F/u LE dopplers  - Cardio consulted (Dr. Monahan) CTA shows RLL PE.   Admit to telemetry  - Full dose lovenox   - F/u LE dopplers  - Trend CE   - Cardio consulted (Dr. Monahan)  - speech and swallow eval as pt trouble swallowing liquids at times CTA shows RLL PE.   Admit to telemetry  - Full dose lovenox 60mg BID  - F/u LE dopplers  - Trend CE   - Cardio consulted (Dr. Monahan)  - speech and swallow eval as pt trouble swallowing liquids at times

## 2018-01-02 LAB
ANION GAP SERPL CALC-SCNC: 9 MMOL/L — SIGNIFICANT CHANGE UP (ref 5–17)
BUN SERPL-MCNC: 11 MG/DL — SIGNIFICANT CHANGE UP (ref 7–23)
CALCIUM SERPL-MCNC: 8.5 MG/DL — SIGNIFICANT CHANGE UP (ref 8.5–10.1)
CHLORIDE SERPL-SCNC: 103 MMOL/L — SIGNIFICANT CHANGE UP (ref 96–108)
CK MB BLD-MCNC: 1 % — SIGNIFICANT CHANGE UP (ref 0–3.5)
CK MB CFR SERPL CALC: 1.4 NG/ML — SIGNIFICANT CHANGE UP (ref 0–3.6)
CK SERPL-CCNC: 140 U/L — SIGNIFICANT CHANGE UP (ref 26–308)
CO2 SERPL-SCNC: 28 MMOL/L — SIGNIFICANT CHANGE UP (ref 22–31)
CREAT SERPL-MCNC: 1.1 MG/DL — SIGNIFICANT CHANGE UP (ref 0.5–1.3)
GLUCOSE SERPL-MCNC: 104 MG/DL — HIGH (ref 70–99)
HCT VFR BLD CALC: 38.3 % — LOW (ref 39–50)
HGB BLD-MCNC: 11.9 G/DL — LOW (ref 13–17)
MCHC RBC-ENTMCNC: 27.9 PG — SIGNIFICANT CHANGE UP (ref 27–34)
MCHC RBC-ENTMCNC: 31.1 GM/DL — LOW (ref 32–36)
MCV RBC AUTO: 89.6 FL — SIGNIFICANT CHANGE UP (ref 80–100)
PLATELET # BLD AUTO: 119 K/UL — LOW (ref 150–400)
POTASSIUM SERPL-MCNC: 3.9 MMOL/L — SIGNIFICANT CHANGE UP (ref 3.5–5.3)
POTASSIUM SERPL-SCNC: 3.9 MMOL/L — SIGNIFICANT CHANGE UP (ref 3.5–5.3)
RBC # BLD: 4.27 M/UL — SIGNIFICANT CHANGE UP (ref 4.2–5.8)
RBC # FLD: 13.6 % — SIGNIFICANT CHANGE UP (ref 10.3–14.5)
SODIUM SERPL-SCNC: 140 MMOL/L — SIGNIFICANT CHANGE UP (ref 135–145)
TROPONIN I SERPL-MCNC: 0.1 NG/ML — HIGH (ref 0.01–0.04)
WBC # BLD: 7.7 K/UL — SIGNIFICANT CHANGE UP (ref 3.8–10.5)
WBC # FLD AUTO: 7.7 K/UL — SIGNIFICANT CHANGE UP (ref 3.8–10.5)

## 2018-01-02 PROCEDURE — 93306 TTE W/DOPPLER COMPLETE: CPT | Mod: 26

## 2018-01-02 PROCEDURE — 99233 SBSQ HOSP IP/OBS HIGH 50: CPT

## 2018-01-02 RX ORDER — SODIUM CHLORIDE 9 MG/ML
1000 INJECTION INTRAMUSCULAR; INTRAVENOUS; SUBCUTANEOUS
Qty: 0 | Refills: 0 | Status: DISCONTINUED | OUTPATIENT
Start: 2018-01-02 | End: 2018-01-03

## 2018-01-02 RX ORDER — ISOSORBIDE MONONITRATE 60 MG/1
120 TABLET, EXTENDED RELEASE ORAL DAILY
Qty: 0 | Refills: 0 | Status: DISCONTINUED | OUTPATIENT
Start: 2018-01-02 | End: 2018-01-02

## 2018-01-02 RX ORDER — FAMOTIDINE 10 MG/ML
20 INJECTION INTRAVENOUS DAILY
Qty: 0 | Refills: 0 | Status: DISCONTINUED | OUTPATIENT
Start: 2018-01-02 | End: 2018-01-03

## 2018-01-02 RX ORDER — ONDANSETRON 8 MG/1
4 TABLET, FILM COATED ORAL EVERY 8 HOURS
Qty: 0 | Refills: 0 | Status: DISCONTINUED | OUTPATIENT
Start: 2018-01-02 | End: 2018-01-03

## 2018-01-02 RX ORDER — SODIUM CHLORIDE 9 MG/ML
250 INJECTION INTRAMUSCULAR; INTRAVENOUS; SUBCUTANEOUS ONCE
Qty: 0 | Refills: 0 | Status: COMPLETED | OUTPATIENT
Start: 2018-01-02 | End: 2018-01-02

## 2018-01-02 RX ADMIN — Medication 40 MILLIGRAM(S): at 05:42

## 2018-01-02 RX ADMIN — SODIUM CHLORIDE 50 MILLILITER(S): 9 INJECTION INTRAMUSCULAR; INTRAVENOUS; SUBCUTANEOUS at 20:29

## 2018-01-02 RX ADMIN — SERTRALINE 100 MILLIGRAM(S): 25 TABLET, FILM COATED ORAL at 14:26

## 2018-01-02 RX ADMIN — ISOSORBIDE MONONITRATE 120 MILLIGRAM(S): 60 TABLET, EXTENDED RELEASE ORAL at 08:40

## 2018-01-02 RX ADMIN — Medication 1000 UNIT(S): at 14:26

## 2018-01-02 RX ADMIN — ONDANSETRON 4 MILLIGRAM(S): 8 TABLET, FILM COATED ORAL at 18:03

## 2018-01-02 RX ADMIN — ENOXAPARIN SODIUM 60 MILLIGRAM(S): 100 INJECTION SUBCUTANEOUS at 17:31

## 2018-01-02 RX ADMIN — DONEPEZIL HYDROCHLORIDE 10 MILLIGRAM(S): 10 TABLET, FILM COATED ORAL at 21:22

## 2018-01-02 RX ADMIN — ATORVASTATIN CALCIUM 40 MILLIGRAM(S): 80 TABLET, FILM COATED ORAL at 21:22

## 2018-01-02 RX ADMIN — SODIUM CHLORIDE 500 MILLILITER(S): 9 INJECTION INTRAMUSCULAR; INTRAVENOUS; SUBCUTANEOUS at 18:03

## 2018-01-02 RX ADMIN — FAMOTIDINE 20 MILLIGRAM(S): 10 INJECTION INTRAVENOUS at 14:26

## 2018-01-02 RX ADMIN — Medication 81 MILLIGRAM(S): at 14:26

## 2018-01-02 RX ADMIN — Medication 10 MILLIGRAM(S): at 14:26

## 2018-01-02 RX ADMIN — ENOXAPARIN SODIUM 60 MILLIGRAM(S): 100 INJECTION SUBCUTANEOUS at 04:00

## 2018-01-02 RX ADMIN — ATENOLOL 25 MILLIGRAM(S): 25 TABLET ORAL at 05:42

## 2018-01-02 RX ADMIN — Medication 20 MILLIEQUIVALENT(S): at 14:26

## 2018-01-02 NOTE — PROGRESS NOTE ADULT - SUBJECTIVE AND OBJECTIVE BOX
[facility] -- Nadeen Sneed, Dayana, Violeta, Mack Gabriel Savella  Office # [phone]      Follow Up:  PE    Subjective/Observations: Patient seen and examined. Events noted. Resting comfortably in bed. No complaints of chest pain, dyspnea, or palpitations reported. No signs of orthopnea or PND.       REVIEW OF SYSTEMS: Limited 2/2 comorbidities     PAST MEDICAL & SURGICAL HISTORY:  Aortic stenosis  CAD (coronary artery disease)  Anxiety  Temporal arteritis  Dementia  CHF (congestive heart failure)  HTN (hypertension)  Bilateral cataracts  History of prostate surgery  Aortic valve replaced  Cardiac defibrillator in place  H/O hernia repair  History of open heart surgery: triple bypass      MEDICATIONS  (STANDING):  aspirin  chewable 81 milliGRAM(s) Oral daily  ATENolol  Tablet 25 milliGRAM(s) Oral two times a day  atorvastatin 40 milliGRAM(s) Oral at bedtime  cholecalciferol 1000 Unit(s) Oral daily  donepezil 10 milliGRAM(s) Oral at bedtime  enoxaparin Injectable 60 milliGRAM(s) SubCutaneous two times a day  furosemide    Tablet 40 milliGRAM(s) Oral daily  isosorbide   mononitrate ER Tablet (IMDUR) 120 milliGRAM(s) Oral <User Schedule>  oxybutynin 10 milliGRAM(s) Oral daily  potassium chloride    Tablet ER 20 milliEquivalent(s) Oral daily  sertraline 100 milliGRAM(s) Oral daily    MEDICATIONS  (PRN):      Allergies    No Known Allergies    Intolerances            Vital Signs Last 24 Hrs  T(C): 36.7 (02 Jan 2018 01:13), Max: 36.7 (01 Jan 2018 19:30)  T(F): 98.1 (02 Jan 2018 01:13), Max: 98.1 (02 Jan 2018 01:13)  HR: 77 (02 Jan 2018 01:13) (77 - 97)  BP: 128/89 (02 Jan 2018 01:13) (128/89 - 153/73)  BP(mean): --  RR: 18 (02 Jan 2018 01:13) (16 - 22)  SpO2: 100% (02 Jan 2018 01:13) (97% - 100%)    I&O's Summary    Weight (kg): 61.2 (01-01 @ 10:58)    PHYSICAL EXAM:  TELE: SR  Constitutional: NAD, awake and alert   HEENT: Moist Mucous Membranes, Anicteric  Pulmonary: Decreased breath sounds b/l. No rales, crackles or wheeze appreciated.    Cardiovascular: Regular, S1 and S2, No murmurs, rubs, gallops or clicks  Gastrointestinal: Bowel Sounds present, soft, nontender.   Lymph: No peripheral edema. No lymphadenopathy.  Skin: No visible rashes or ulcers.  Psych:  Mood & affect appropriate    LABS: All Labs Reviewed:                        11.9   7.7   )-----------( 119      ( 02 Jan 2018 05:52 )             38.3                         11.9   7.0   )-----------( 122      ( 01 Jan 2018 11:52 )             36.7     02 Jan 2018 05:52    140    |  103    |  11     ----------------------------<  104    3.9     |  28     |  1.10   01 Jan 2018 12:21    143    |  107    |  13     ----------------------------<  113    3.8     |  28     |  1.20     Ca    8.5        02 Jan 2018 05:52  Ca    8.3        01 Jan 2018 12:21    TPro  6.8    /  Alb  3.2    /  TBili  1.1    /  DBili  x      /  AST  20     /  ALT  15     /  AlkPhos  106    01 Jan 2018 12:21    PT/INR - ( 01 Jan 2018 11:52 )   PT: 12.7 sec;   INR: 1.16 ratio         PTT - ( 01 Jan 2018 11:52 )  PTT:30.7 sec  CARDIAC MARKERS ( 02 Jan 2018 05:52 )  .098 ng/mL / x     / 140 U/L / x     / 1.4 ng/mL  CARDIAC MARKERS ( 01 Jan 2018 21:04 )  .086 ng/mL / x     / 75 U/L / x     / 0.9 ng/mL  CARDIAC MARKERS ( 01 Jan 2018 12:21 )  .062 ng/mL / x     / 59 U/L / x     / 0.9 ng/mL

## 2018-01-02 NOTE — PROGRESS NOTE ADULT - ASSESSMENT
This is an 85M with PMH of CAD s/p CABGx 1 v in 2009, mm PCI procedures, CHF (EF?), ICD,  HTN, aortic stenosis s/p bioprosthetic aortic valve replacement, carotid disease s/p b/l CEA, dementia presents with RUQ pain since this morning at 2 AM, incidentally found to have RLL PE:    - Monitor telemetry  - CE not c/w ACS  - Needs 2 D  echo to assess RV and LV function  - Full dose Lovenox at 60 BID  - Continue aspirin 81 qd and keep off of Brillinta  - Continue atenolol 25 BID  - Lipitor 40 hs  - Continue Lasix 40 qd. Appear euvolemic  - Continue Imdur 120 qd  - Monitor and replete lytes  - Supplemental oxygen as needed  - WOrk up of abdominal pain per primary team which he is not complaining of at this time.   - Further cardiac workup will depend on clinical course.   - All other workup per primary team. Will followup. This is an 85M with PMH of CAD s/p CABGx 1 v in 2009, mm PCI procedures, CHF (EF?), ICD,  HTN, aortic stenosis s/p bioprosthetic aortic valve replacement, carotid disease s/p b/l CEA, dementia presents with RUQ pain since this morning at 2 AM, incidentally found to have RLL PE:    - Monitor telemetry  - CE not c/w ACS  - Needs 2 D  echo to assess RV and LV function  - Full dose Lovenox at 60 BID....will likely need to be transitioned to a NOAC  - Continue aspirin 81 qd and keep off of Brillinta  - Continue atenolol 25 BID  - Lipitor 40 hs  - Continue Lasix 40 qd. Appear euvolemic  - Continue Imdur 120 qd  - Monitor and replete lytes  - Supplemental oxygen as needed  - WOrk up of abdominal pain per primary team which he is not complaining of at this time.   - Further cardiac workup will depend on clinical course.   - All other workup per primary team. Will followup.

## 2018-01-02 NOTE — PROGRESS NOTE ADULT - ASSESSMENT
85M with PMH of CAD s/p CABG, CHF, HTN, aortic stenosis s/p replacement, dementia presents with RUQ pain and admitted for RLL PE.

## 2018-01-02 NOTE — CONSULT NOTE ADULT - SUBJECTIVE AND OBJECTIVE BOX
Date/Time Patient Seen:  		  Referring MD:   Data Reviewed	       Patient is a 85y old  Male who presents with a chief complaint of Abdominal pain (01 Jan 2018 16:19)      Subjective/HPI  new PE RLL  seen and examined  CT reviewed  LE dopplers NEG for DVT     lives at home  verbal  alert  medical hx reviewed         PAST MEDICAL & SURGICAL HISTORY:  Aortic stenosis  CAD (coronary artery disease)  Anxiety  Temporal arteritis  Dementia  CHF (congestive heart failure)  HTN (hypertension)  Bilateral cataracts  History of prostate surgery  Aortic valve replaced  Cardiac defibrillator in place  H/O hernia repair  History of open heart surgery: triple bypass        Medication list         MEDICATIONS  (STANDING):  aspirin  chewable 81 milliGRAM(s) Oral daily  ATENolol  Tablet 25 milliGRAM(s) Oral two times a day  atorvastatin 40 milliGRAM(s) Oral at bedtime  cholecalciferol 1000 Unit(s) Oral daily  donepezil 10 milliGRAM(s) Oral at bedtime  enoxaparin Injectable 60 milliGRAM(s) SubCutaneous two times a day  famotidine    Tablet 20 milliGRAM(s) Oral daily  furosemide    Tablet 40 milliGRAM(s) Oral daily  isosorbide   mononitrate ER Tablet (IMDUR) 120 milliGRAM(s) Oral <User Schedule>  oxybutynin 10 milliGRAM(s) Oral daily  potassium chloride    Tablet ER 20 milliEquivalent(s) Oral daily  sertraline 100 milliGRAM(s) Oral daily    MEDICATIONS  (PRN):         Vitals log        ICU Vital Signs Last 24 Hrs  T(C): 36.7 (02 Jan 2018 10:00), Max: 36.7 (01 Jan 2018 19:30)  T(F): 98 (02 Jan 2018 10:00), Max: 98.1 (02 Jan 2018 01:13)  HR: 62 (02 Jan 2018 10:00) (62 - 88)  BP: 110/54 (02 Jan 2018 10:00) (110/54 - 141/80)  BP(mean): --  ABP: --  ABP(mean): --  RR: 16 (02 Jan 2018 10:00) (16 - 22)  SpO2: 94% (02 Jan 2018 10:00) (94% - 100%)           Input and Output:  I&O's Detail      Lab Data                        11.9   7.7   )-----------( 119      ( 02 Jan 2018 05:52 )             38.3     01-02    140  |  103  |  11  ----------------------------<  104<H>  3.9   |  28  |  1.10    Ca    8.5      02 Jan 2018 05:52    TPro  6.8  /  Alb  3.2<L>  /  TBili  1.1  /  DBili  x   /  AST  20  /  ALT  15  /  AlkPhos  106  01-01      CARDIAC MARKERS ( 02 Jan 2018 05:52 )  .098 ng/mL / x     / 140 U/L / x     / 1.4 ng/mL  CARDIAC MARKERS ( 01 Jan 2018 21:04 )  .086 ng/mL / x     / 75 U/L / x     / 0.9 ng/mL  CARDIAC MARKERS ( 01 Jan 2018 12:21 )  .062 ng/mL / x     / 59 U/L / x     / 0.9 ng/mL        Review of Systems	      Objective     Physical Examination    head at  heart s1s2  lungs dec BS  abd soft  cn grossly int  extr no gross edema      Pertinent Lab findings & Imaging      Monse:  NO   Adequate UO     I&O's Detail           Discussed with:     Cultures:	        Radiology      RLL pe  Atelectasis

## 2018-01-02 NOTE — SWALLOW BEDSIDE ASSESSMENT ADULT - COMMENTS
85M with PMH of CAD s/p CABG, CHF , HTN, aortic stenosis s/p replacement, dementia presents with RUQ pain   pt awake, alert, confused, poor historian  delayed ,weak mastication c solids. pt c clear breath sounds pre/post deglutition via cervical auscultation

## 2018-01-02 NOTE — GOALS OF CARE CONVERSATION - PERSONAL ADVANCE DIRECTIVE - CONVERSATION DETAILS
met pt w his LEROY Owusu, provided hcp form for chart. discussed pt directives. pt has spoken to SO prior, at present pt wants resuscitation, does not want to have a trach or live on ventilator, simple lay terms. pt remains full code. contact # given.

## 2018-01-02 NOTE — PATIENT PROFILE ADULT. - FALL HARM RISK
age(85 years old or older)/bones(Osteoporosis,prev fx,steroid use,metastatic bone ca/coagulation(Bleeding disorder R/T clinical cond/anti-coags)

## 2018-01-02 NOTE — PHYSICAL THERAPY INITIAL EVALUATION ADULT - ADDITIONAL COMMENTS
lives with spouse. has 4 rosi with railing  amb with hha of spouse in house and has rw for community use

## 2018-01-02 NOTE — CONSULT NOTE ADULT - PROBLEM SELECTOR RECOMMENDATION 2
PE on CTA chest  LE dopplers neg  monitor sat and VS and HD  out of bed and ambulate as tolerated  on tele monitor  will ask pall care eval for MOLST discussion  will transition to NOAC prior to discharge  overall prognosis guarded, unclear etiology of PE, will need age appropriate cancer screening  does not appear to have resp distress or HD instability

## 2018-01-02 NOTE — CONSULT NOTE ADULT - SUBJECTIVE AND OBJECTIVE BOX
All records reviewed.    HPI:  85M with hx  CAD s/p CABG, CHF, HTN, aortic stenosis s/p replacement, dementia presents with RUQ abdomen pain since 2 AM day of admission.  CT angio chest, abdomen/pelvis sig for RLL pulm embolus and cholelithiasis  Wilfrid legs dopplers no DVT  Wife reports pt chronically not very active, remains sedantary  No family hx of DVT/PE, pt's father  of MI    PAST MEDICAL & SURGICAL HISTORY:  Aortic stenosis  CAD (coronary artery disease)  Anxiety  Temporal arteritis  Dementia  CHF (congestive heart failure)  HTN (hypertension)  Bilateral cataracts  History of prostate surgery  Aortic valve replaced  Cardiac defibrillator in place  H/O hernia repair  History of open heart surgery: triple bypass      Review of System:  no longer w abdomen RUQ pain since Lovenox  no weight loss/fever/anorexia/SOB/CP/incr rodolfo edema    MEDICATIONS  (STANDING):  aspirin  chewable 81 milliGRAM(s) Oral daily  ATENolol  Tablet 25 milliGRAM(s) Oral two times a day  atorvastatin 40 milliGRAM(s) Oral at bedtime  cholecalciferol 1000 Unit(s) Oral daily  donepezil 10 milliGRAM(s) Oral at bedtime  enoxaparin Injectable 60 milliGRAM(s) SubCutaneous two times a day  famotidine    Tablet 20 milliGRAM(s) Oral daily  furosemide    Tablet 40 milliGRAM(s) Oral daily  isosorbide   mononitrate ER Tablet (IMDUR) 120 milliGRAM(s) Oral <User Schedule>  oxybutynin 10 milliGRAM(s) Oral daily  potassium chloride    Tablet ER 20 milliEquivalent(s) Oral daily  sertraline 100 milliGRAM(s) Oral daily    MEDICATIONS  (PRN):      Allergies    No Known Allergies    Intolerances        SOCIAL HISTORY:  occ cigars in past, stopped many years ago(can't be more specific)  no Etoh    FAMILY HISTORY:  father  of MI      Vital Signs Last 24 Hrs  T(C): 36.9 (2018 12:53), Max: 36.9 (2018 12:53)  T(F): 98.5 (2018 12:53), Max: 98.5 (2018 12:53)  HR: 59 (2018 12:53) (59 - 82)  BP: 110/54 (2018 10:00) (110/54 - 141/80)  BP(mean): --  RR: 16 (2018 10:00) (16 - 22)  SpO2: 94% (2018 10:00) (94% - 100%)    PHYSICAL EXAM:      General:well developed well nourished, in no acute distress, sl confused  Eyes:sclera anicteric, pupils equal and reactive to light  ENMT:buccal mucosa moist, pharynx not injected  Neck:supple, trachea midline  Lungs:clear, no wheeze/rhonchi  Cardiovascular:regular rate and rhythm, S1 S2  Abdomen:soft, nontender, no organomegaly present, bowel sounds normal  Neurological:alert and oriented x3, Cranial Nerves II-XII grossly intact  Skin:no increased ecchymosis/petechiae/purpura  Lymph Nodes:no palpable cervical/supraclavicular lymph nodes enlargements  Extremities:no cyanosis/clubbing/edema        LABS:                        11.9   7.7   )-----------( 119      ( 2018 05:52 )             38.3      @ 05:52  WBC7.7  RBC4.27 Hgb11.9 Hct38.3  MCV89.6  [id number]  Auto Neutro-- Band-- Auto Lymph-- Atypical Lymph-- Reactive Lymph-- Auto Mono-- Auto Eos-- Auto Baso--         @ 11:52  WBC7.0  RBC4.10 Hgb11.9 Hct36.7  MCV89.5  [id number]  Auto Ktqwsh43.2 Band-- Auto Lymph6.7 Atypical Lymph-- Reactive Lymph-- Auto Mono12.6 Auto Eos0.2 Auto Baso1.2              140  |  103  |  11  ----------------------------<  104<H>  3.9   |  28  |  1.10    Ca    8.5      2018 05:52    TPro  6.8  /  Alb  3.2<L>  /  TBili  1.1  /  DBili  x   /  AST  20  /  ALT  15  /  AlkPhos  106   @ 11:52  PT12.7 INR1.16  PTT30.7        PERIPHERAL BLOOD SMEAR REVIEW:      RADIOLOGY & ADDITIONAL STUDIES:  < from: CT Angio Chest w/ IV Cont (18 @ 15:04) >  EXAM:  CT ABDOMEN AND PELVIS OC IC                          EXAM:  CT ANGIO CHEST (W)AW IC                            PROCEDURE DATE:  2018          INTERPRETATION:  CLINICAL INFORMATION: Chest pain, abdominal pain    COMPARISON: None    PROCEDURE:   CT Angiography of the Chest was performed followed by portal venous phase   imaging of the Abdomen and Pelvis.  95 ml of Omnipaque 350 was injected intravenously. 5 ml were discarded.  Sagittal and coronal reformats were performed as well as 3D (MIP)   reconstructions.    FINDINGS:    CHEST:     LUNGS AND LARGE AIRWAYS: Patent central airways. Right lower lobe   opacities. Bilateral dependent atelectasis.  PLEURA: Trace right pleural effusion.  VESSELS: Atherosclerotic changes of thoracicaorta and coronary arteries.   Pulmonary arterial filling defects identified within the right lower lobe.  HEART: Heart size is normal. Aortic valve prosthesis. No pericardial   effusion.  MEDIASTINUM AND EMMY: No lymphadenopathy.  CHEST WALL AND LOWER NECK: Status post sternotomy. Left anterior chest   wall cardiac conduction device with lead to the heart.    ABDOMEN AND PELVIS:    LIVER: Within normal limits.  BILE DUCTS: Normal caliber.  GALLBLADDER: Cholelithiasis.  SPLEEN: Within normal limits.  PANCREAS: Atrophic.  ADRENALS: Mild thickening.  KIDNEYS/URETERS: Subcentimeter hypodense renal lesions, too small to   further characterize. Renal cysts measuring up to 2.2 cm.    BLADDER: Within normal limits.  REPRODUCTIVE ORGANS: The prostateis enlarged.    BOWEL: No bowel obstruction. Appendix not visualized. Colonic   diverticulosis.  PERITONEUM: No ascites.  VESSELS:  Atherosclerotic changes.  RETROPERITONEUM: No lymphadenopathy.    ABDOMINAL WALL: Tiny fat-containing umbilical hernia.  BONES: Shoulder, hip, and spine degenerative changes.    IMPRESSION:     Positive for pulmonary emboli in the right lower lobe.  Right lower lobe opacities, which may represent infarct and/or pneumonia.  Cholelithiasis.    Findings were discussed with Dr. Martinez by telephone on 2018 at   1520 hours.                HIMA SOLIS M.D., ATTENDING RADIOLOGIST  This document has been electronically signed. 2018  3:23PM       < end of copied text >  < from: CT Abdomen and Pelvis w/ Oral Cont and w/ IV Cont (18 @ 15:01) >  EXAM:  CT ABDOMEN AND PELVIS OC IC                          EXAM:  CT ANGIO CHEST (W)AW IC                            PROCEDURE DATE:  2018          INTERPRETATION:  CLINICAL INFORMATION: Chest pain, abdominal pain    COMPARISON: None    PROCEDURE:   CT Angiography of the Chest was performed followed by portal venous phase   imaging of the Abdomen and Pelvis.  95 ml of Omnipaque 350 was injected intravenously. 5 ml were discarded.  Sagittal and coronal reformats were performed as well as 3D (MIP)   reconstructions.    FINDINGS:    CHEST:     LUNGS AND LARGE AIRWAYS: Patent central airways. Right lower lobe   opacities. Bilateral dependent atelectasis.  PLEURA: Trace right pleural effusion.  VESSELS: Atherosclerotic changes of thoracicaorta and coronary arteries.   Pulmonary arterial filling defects identified within the right lower lobe.  HEART: Heart size is normal. Aortic valve prosthesis. No pericardial   effusion.  MEDIASTINUM AND EMMY: No lymphadenopathy.  CHEST WALL AND LOWER NECK: Status post sternotomy. Left anterior chest   wall cardiac conduction device with lead to the heart.    ABDOMEN AND PELVIS:    LIVER: Within normal limits.  BILE DUCTS: Normal caliber.  GALLBLADDER: Cholelithiasis.  SPLEEN: Within normal limits.  PANCREAS: Atrophic.  ADRENALS: Mild thickening.  KIDNEYS/URETERS: Subcentimeter hypodense renal lesions, too small to   further characterize. Renal cysts measuring up to 2.2 cm.    BLADDER: Within normal limits.  REPRODUCTIVE ORGANS: The prostateis enlarged.    BOWEL: No bowel obstruction. Appendix not visualized. Colonic   diverticulosis.  PERITONEUM: No ascites.  VESSELS:  Atherosclerotic changes.  RETROPERITONEUM: No lymphadenopathy.    ABDOMINAL WALL: Tiny fat-containing umbilical hernia.  BONES: Shoulder, hip, and spine degenerative changes.    IMPRESSION:     Positive for pulmonary emboli in the right lower lobe.  Right lower lobe opacities, which may represent infarct and/or pneumonia.  Cholelithiasis.    Findings were discussed with Dr. Martinez by telephone on 2018 at   1520 hours.      < end of copied text >  < from: US Duplex Venous Lower Ext Complete, Bilateral (18 @ 19:29) >  EXAM:  US DPLX LWR EXT VEINS COMPL BI                            PROCEDURE DATE:  2018          INTERPRETATION:  CLINICAL INFORMATION: Dyspnea pulmonary embolism    COMPARISON: None available.    TECHNIQUE: Duplex sonography of the BILATERAL LOWER extremities with   color and spectral Doppler, with and without compression.      FINDINGS:    There is normal compressibility of the bilateral common femoral, femoral   and popliteal veins. No calf vein thrombosis is detected.    Doppler examination shows normal spontaneous and phasic flow.    IMPRESSION:     No evidence of bilateral lower extremity deep venous thrombosis.                    ELVIRA LIN M.D., ATTENDING RADIOLOGIST  This document has been electronically signed. 2018  7:36PM        < end of copied text >

## 2018-01-02 NOTE — SWALLOW BEDSIDE ASSESSMENT ADULT - SWALLOW EVAL: RECOMMENDED FEEDING/EATING TECHNIQUES
alternate food with liquid/maintain upright posture during/after eating for 30 mins/no straws/crush medication (when feasible)/hard swallow w/ each bite or sip

## 2018-01-02 NOTE — PROGRESS NOTE ADULT - SUBJECTIVE AND OBJECTIVE BOX
Patient is a 85y old  Male who presents with a chief complaint of Abdominal pain (01 Jan 2018 16:19)    INTERVAL HPI/OVERNIGHT EVENTS: Patient was seen and examined at bedside this morning, in no distress and with no acute complaints. RUQ abdominal pain from yesterday is no longer present.     MEDICATIONS  (STANDING):  aspirin  chewable 81 milliGRAM(s) Oral daily  ATENolol  Tablet 25 milliGRAM(s) Oral two times a day  atorvastatin 40 milliGRAM(s) Oral at bedtime  cholecalciferol 1000 Unit(s) Oral daily  donepezil 10 milliGRAM(s) Oral at bedtime  enoxaparin Injectable 60 milliGRAM(s) SubCutaneous two times a day  famotidine    Tablet 20 milliGRAM(s) Oral daily  furosemide    Tablet 40 milliGRAM(s) Oral daily  isosorbide   mononitrate ER Tablet (IMDUR) 120 milliGRAM(s) Oral <User Schedule>  oxybutynin 10 milliGRAM(s) Oral daily  potassium chloride    Tablet ER 20 milliEquivalent(s) Oral daily  sertraline 100 milliGRAM(s) Oral daily    MEDICATIONS  (PRN):      Allergies    No Known Allergies    Intolerances        REVIEW OF SYSTEMS:  CONSTITUTIONAL: No fever, No chills,No fatigue,No myalgia,No Body ache  EYES: No eye pain, visual disturbances, or discharge  ENMT:  No ear pain, No nose bleed, No vertigo; No sinus or throat pain  NECK: No pain, No stiffness  RESPIRATORY: No cough, wheezing, No  hemoptysis; No shortness of breath  CARDIOVASCULAR: No chest pain, palpitations, leg swelling  GASTROINTESTINAL: No abdominal or epigastric pain. No nausea, No vomiting; No diarrhea or constipation. [ ] BM  GENITOURINARY: No dysuria, No frequency, No urgency, No hematuria, or incontinence  NEUROLOGICAL: alert and oriented x 3,  No headaches, No dizziness, No numbness,  SKIN:   No itching, burning, rashes, or lesions   MUSCULOSKELETAL: No joint pain or swelling; No muscle pain, No back pain, No extremity pain  PSYCHIATRIC: No depression, anxiety, mood swings, or difficulty sleeping  ROS  [ ] Unable to obtain   REST OF REVIEW Of SYSTEM - [ ] Normal     Height (cm): 165.1 (01-01 @ 10:58)  Weight (kg): 61.2 (01-01 @ 10:58)  BMI (kg/m2): 22.5 (01-01 @ 10:58)  BSA (m2): 1.67 (01-01 @ 10:58)  Vital Signs Last 24 Hrs  T(C): 36.7 (02 Jan 2018 10:00), Max: 36.7 (01 Jan 2018 19:30)  T(F): 98 (02 Jan 2018 10:00), Max: 98.1 (02 Jan 2018 01:13)  HR: 62 (02 Jan 2018 10:00) (62 - 88)  BP: 110/54 (02 Jan 2018 10:00) (110/54 - 141/80)  RR: 16 (02 Jan 2018 10:00) (16 - 22)  SpO2: 94% (02 Jan 2018 10:00) (94% - 100%)  [ ] room air   [x ] 02    PHYSICAL EXAM:  GENERAL:  No acute distresss,  [ ] Agitated, [ ] Lethargy, [ ] confused   HEAD:  normal  ENMT: normal  NECK:  normal    NERVOUS SYSTEM:  Alert & Oriented X2, [x]Confusion  [ ] Encephalopathic [ ] Sedated [ ] Other  CHEST/LUNG: Clear to auscultation bilaterally,  [ ] decreased breath sounds at bases  [ ] wheezing   [ ] rhonchi  [ ] crackles  HEART:  Regular rate and rhythm, No murmurs, rubs, or gallops,  [ ] irregular   ABDOMEN:  soft, nontender, nondistended, positive bowel sounds   [ ] obese  EXTREMITIES: No clubbing, or cyanosis. + trace edema b/l LE  SKIN: [ ] venous stasis skin changes    LABS:                        11.9   7.7   )-----------( 119      ( 02 Jan 2018 05:52 )             38.3     02 Jan 2018 05:52    140    |  103    |  11     ----------------------------<  104    3.9     |  28     |  1.10     Ca    8.5        02 Jan 2018 05:52    TPro  6.8    /  Alb  3.2    /  TBili  1.1    /  DBili  x      /  AST  20     /  ALT  15     /  AlkPhos  106    01 Jan 2018 12:21    PT/INR - ( 01 Jan 2018 11:52 )   PT: 12.7 sec;   INR: 1.16 ratio         PTT - ( 01 Jan 2018 11:52 )  PTT:30.7 sec    Care Discussed with [X] Consultants  [ ] Patient  [ ] Family  [X]   /   [ ] Other; RN  DVT prophylaxis [x] lovenox   [ ] subq heparin  [ ] coumadin  [ ] venodynes [ ] ambulating frequently at how risk for vte and no pharm         or  mechanical prophylaxis required    [ ] other   Advanced directive:    [ ]pt has hcp     [ ] pt declined to assign hcp  Discussed with pt @ bedside

## 2018-01-02 NOTE — CONSULT NOTE ADULT - ASSESSMENT
85M with hx  CAD s/p CABG, CHF, HTN, aortic stenosis s/p replacement, dementia presents with RUQ abdomen pain since 2 AM day of admission.  CT angio chest, abdomen/pelvis sig for RLL pulm embolus and cholelithiasis  Wilfrid legs dopplers no DVT  No prior or family hx of DVT/PE  Started on Lovenox  No longer w RUQ pain    Seen by Pulm. Workup to r/o right sided heart strain in progress.  Once stable, agree can change Lovenox to one of the new oral antithrombin/Factor X inhibitors anticoagulants ie Xarelto/Eliquis  No thrombophilia w/u in hospital necessary  Etiology ?due to decreased mobility  Ideally should be on indefinite anticoagulation but need to weigh against risk of bleed. Therefore at least 3months, and then will re-eval  Will f/u in office as outpatient.  Discussed w wife.    Thank you, please call if any questions.

## 2018-01-03 ENCOUNTER — TRANSCRIPTION ENCOUNTER (OUTPATIENT)
Age: 83
End: 2018-01-03

## 2018-01-03 VITALS — WEIGHT: 134.48 LBS

## 2018-01-03 DIAGNOSIS — N17.9 ACUTE KIDNEY FAILURE, UNSPECIFIED: ICD-10-CM

## 2018-01-03 LAB
ANION GAP SERPL CALC-SCNC: 7 MMOL/L — SIGNIFICANT CHANGE UP (ref 5–17)
APPEARANCE UR: CLEAR — SIGNIFICANT CHANGE UP
BILIRUB UR-MCNC: ABNORMAL
BUN SERPL-MCNC: 19 MG/DL — SIGNIFICANT CHANGE UP (ref 7–23)
CALCIUM SERPL-MCNC: 8.3 MG/DL — LOW (ref 8.5–10.1)
CHLORIDE SERPL-SCNC: 102 MMOL/L — SIGNIFICANT CHANGE UP (ref 96–108)
CO2 SERPL-SCNC: 29 MMOL/L — SIGNIFICANT CHANGE UP (ref 22–31)
COLOR SPEC: YELLOW — SIGNIFICANT CHANGE UP
CREAT SERPL-MCNC: 1.4 MG/DL — HIGH (ref 0.5–1.3)
DIFF PNL FLD: ABNORMAL
GLUCOSE SERPL-MCNC: 117 MG/DL — HIGH (ref 70–99)
GLUCOSE UR QL: NEGATIVE — SIGNIFICANT CHANGE UP
HCT VFR BLD CALC: 30.7 % — LOW (ref 39–50)
HGB BLD-MCNC: 10.1 G/DL — LOW (ref 13–17)
KETONES UR-MCNC: ABNORMAL
LEUKOCYTE ESTERASE UR-ACNC: ABNORMAL
MCHC RBC-ENTMCNC: 29 PG — SIGNIFICANT CHANGE UP (ref 27–34)
MCHC RBC-ENTMCNC: 32.8 GM/DL — SIGNIFICANT CHANGE UP (ref 32–36)
MCV RBC AUTO: 88.4 FL — SIGNIFICANT CHANGE UP (ref 80–100)
NITRITE UR-MCNC: NEGATIVE — SIGNIFICANT CHANGE UP
PH UR: 5 — SIGNIFICANT CHANGE UP (ref 5–8)
PLATELET # BLD AUTO: 124 K/UL — LOW (ref 150–400)
POTASSIUM SERPL-MCNC: 4.3 MMOL/L — SIGNIFICANT CHANGE UP (ref 3.5–5.3)
POTASSIUM SERPL-SCNC: 4.3 MMOL/L — SIGNIFICANT CHANGE UP (ref 3.5–5.3)
PROT UR-MCNC: 25 MG/DL
RBC # BLD: 3.48 M/UL — LOW (ref 4.2–5.8)
RBC # FLD: 13.3 % — SIGNIFICANT CHANGE UP (ref 10.3–14.5)
SODIUM SERPL-SCNC: 138 MMOL/L — SIGNIFICANT CHANGE UP (ref 135–145)
SP GR SPEC: 1.01 — SIGNIFICANT CHANGE UP (ref 1.01–1.02)
UROBILINOGEN FLD QL: 1
WBC # BLD: 6 K/UL — SIGNIFICANT CHANGE UP (ref 3.8–10.5)
WBC # FLD AUTO: 6 K/UL — SIGNIFICANT CHANGE UP (ref 3.8–10.5)

## 2018-01-03 PROCEDURE — 97161 PT EVAL LOW COMPLEX 20 MIN: CPT

## 2018-01-03 PROCEDURE — 96375 TX/PRO/DX INJ NEW DRUG ADDON: CPT

## 2018-01-03 PROCEDURE — 99285 EMERGENCY DEPT VISIT HI MDM: CPT | Mod: 25

## 2018-01-03 PROCEDURE — 93005 ELECTROCARDIOGRAM TRACING: CPT

## 2018-01-03 PROCEDURE — 80053 COMPREHEN METABOLIC PANEL: CPT

## 2018-01-03 PROCEDURE — 83690 ASSAY OF LIPASE: CPT

## 2018-01-03 PROCEDURE — 84484 ASSAY OF TROPONIN QUANT: CPT

## 2018-01-03 PROCEDURE — 87186 SC STD MICRODIL/AGAR DIL: CPT

## 2018-01-03 PROCEDURE — 85027 COMPLETE CBC AUTOMATED: CPT

## 2018-01-03 PROCEDURE — 85730 THROMBOPLASTIN TIME PARTIAL: CPT

## 2018-01-03 PROCEDURE — 74177 CT ABD & PELVIS W/CONTRAST: CPT

## 2018-01-03 PROCEDURE — 71275 CT ANGIOGRAPHY CHEST: CPT

## 2018-01-03 PROCEDURE — 71045 X-RAY EXAM CHEST 1 VIEW: CPT

## 2018-01-03 PROCEDURE — 85379 FIBRIN DEGRADATION QUANT: CPT

## 2018-01-03 PROCEDURE — 96376 TX/PRO/DX INJ SAME DRUG ADON: CPT

## 2018-01-03 PROCEDURE — 99233 SBSQ HOSP IP/OBS HIGH 50: CPT

## 2018-01-03 PROCEDURE — 36415 COLL VENOUS BLD VENIPUNCTURE: CPT

## 2018-01-03 PROCEDURE — 82553 CREATINE MB FRACTION: CPT

## 2018-01-03 PROCEDURE — 87086 URINE CULTURE/COLONY COUNT: CPT

## 2018-01-03 PROCEDURE — 97116 GAIT TRAINING THERAPY: CPT

## 2018-01-03 PROCEDURE — 74019 RADEX ABDOMEN 2 VIEWS: CPT

## 2018-01-03 PROCEDURE — 97530 THERAPEUTIC ACTIVITIES: CPT

## 2018-01-03 PROCEDURE — 85610 PROTHROMBIN TIME: CPT

## 2018-01-03 PROCEDURE — 93970 EXTREMITY STUDY: CPT

## 2018-01-03 PROCEDURE — 82550 ASSAY OF CK (CPK): CPT

## 2018-01-03 PROCEDURE — 82150 ASSAY OF AMYLASE: CPT

## 2018-01-03 PROCEDURE — 93306 TTE W/DOPPLER COMPLETE: CPT

## 2018-01-03 PROCEDURE — 81001 URINALYSIS AUTO W/SCOPE: CPT

## 2018-01-03 PROCEDURE — 96372 THER/PROPH/DIAG INJ SC/IM: CPT | Mod: 59

## 2018-01-03 PROCEDURE — 96374 THER/PROPH/DIAG INJ IV PUSH: CPT

## 2018-01-03 PROCEDURE — 80048 BASIC METABOLIC PNL TOTAL CA: CPT

## 2018-01-03 RX ORDER — TICAGRELOR 90 MG/1
1 TABLET ORAL
Qty: 0 | Refills: 0 | COMMUNITY

## 2018-01-03 RX ORDER — FUROSEMIDE 40 MG
1 TABLET ORAL
Qty: 0 | Refills: 0 | COMMUNITY

## 2018-01-03 RX ORDER — ATENOLOL 25 MG/1
1 TABLET ORAL
Qty: 0 | Refills: 0 | COMMUNITY

## 2018-01-03 RX ORDER — ISOSORBIDE MONONITRATE 60 MG/1
1 TABLET, EXTENDED RELEASE ORAL
Qty: 0 | Refills: 0 | COMMUNITY

## 2018-01-03 RX ORDER — APIXABAN 2.5 MG/1
1 TABLET, FILM COATED ORAL
Qty: 60 | Refills: 0 | OUTPATIENT
Start: 2018-01-03 | End: 2018-02-01

## 2018-01-03 RX ORDER — POTASSIUM CHLORIDE 20 MEQ
1 PACKET (EA) ORAL
Qty: 0 | Refills: 0 | COMMUNITY

## 2018-01-03 RX ORDER — ATENOLOL 25 MG/1
1 TABLET ORAL
Qty: 30 | Refills: 0 | OUTPATIENT
Start: 2018-01-03 | End: 2018-02-01

## 2018-01-03 RX ADMIN — ENOXAPARIN SODIUM 60 MILLIGRAM(S): 100 INJECTION SUBCUTANEOUS at 05:30

## 2018-01-03 RX ADMIN — FAMOTIDINE 20 MILLIGRAM(S): 10 INJECTION INTRAVENOUS at 11:14

## 2018-01-03 RX ADMIN — SERTRALINE 100 MILLIGRAM(S): 25 TABLET, FILM COATED ORAL at 11:14

## 2018-01-03 RX ADMIN — Medication 10 MILLIGRAM(S): at 11:14

## 2018-01-03 RX ADMIN — Medication 81 MILLIGRAM(S): at 11:14

## 2018-01-03 RX ADMIN — Medication 1000 UNIT(S): at 11:14

## 2018-01-03 RX ADMIN — ENOXAPARIN SODIUM 60 MILLIGRAM(S): 100 INJECTION SUBCUTANEOUS at 16:10

## 2018-01-03 NOTE — PROGRESS NOTE ADULT - SUBJECTIVE AND OBJECTIVE BOX
Patient is a 85y old  Male who presents with a chief complaint of Abdominal pain (01 Jan 2018 16:19)      INTERVAL HPI/OVERNIGHT EVENTS: Confusion worsened, BP decreased, and pt vomited at approx 1800 last evening. Full neuro exam performed; no focal deficits.  Emesis occurred just after oral potassium supp. given. NBNB. Zofran offered, PO intake encouraged, KCL d/c'd.  No further emesis. BP meds held (pt is not taking them at home); 250mL bolus followed by 50mL/hr for 1L. SBP >100 since fluids. Nurses noting increased difficuly ambulating; PT to reassess d/c recs.     MEDICATIONS  (STANDING):  aspirin  chewable 81 milliGRAM(s) Oral daily  atorvastatin 40 milliGRAM(s) Oral at bedtime  cholecalciferol 1000 Unit(s) Oral daily  donepezil 10 milliGRAM(s) Oral at bedtime  enoxaparin Injectable 60 milliGRAM(s) SubCutaneous two times a day  famotidine    Tablet 20 milliGRAM(s) Oral daily  oxybutynin 10 milliGRAM(s) Oral daily  sertraline 100 milliGRAM(s) Oral daily    MEDICATIONS  (PRN):  ondansetron Injectable 4 milliGRAM(s) IV Push every 8 hours PRN Nausea and/or Vomiting    Allergies  No Known Allergies    Intolerances      REVIEW OF SYSTEMS:  CONSTITUTIONAL: No fever, No chills,No fatigue,No myalgia,No Body ache  EYES: No eye pain, visual disturbances, or discharge  ENMT:  No ear pain, No nose bleed, No vertigo; No sinus or throat pain  NECK: No pain, No stiffness  RESPIRATORY: No cough, wheezing, No  hemoptysis; No shortness of breath  CARDIOVASCULAR: No chest pain, palpitations, leg swelling  GASTROINTESTINAL: No abdominal or epigastric pain. No nausea, No vomiting; No diarrhea or constipation. [ ] BM  GENITOURINARY: No dysuria, No frequency, No urgency, No hematuria, or incontinence  NEUROLOGICAL: alert and oriented x 3,  No headaches, No dizziness, No numbness,  SKIN:   No itching, burning, rashes, or lesions   MUSCULOSKELETAL: No joint pain or swelling; No muscle pain, No back pain, No extremity pain  PSYCHIATRIC: No depression, anxiety, mood swings, or difficulty sleeping  ROS  [ x] Unable to obtain 2/2 mental status  REST OF REVIEW Of SYSTEM - [ ] Normal     Height (cm): 165.1 (01-01 @ 10:58)  Weight (kg): 61.2 (01-01 @ 10:58)  BMI (kg/m2): 22.5 (01-01 @ 10:58)  BSA (m2): 1.67 (01-01 @ 10:58)  Vital Signs Last 24 Hrs  T(C): 36.7 (03 Jan 2018 07:44), Max: 37.8 (02 Jan 2018 21:07)  T(F): 98.1 (03 Jan 2018 07:44), Max: 100 (02 Jan 2018 21:07)  HR: 71 (03 Jan 2018 07:44) (59 - 95)  BP: 129/68 (03 Jan 2018 07:44) (94/51 - 129/68)  RR: 16 (03 Jan 2018 07:44) (16 - 18)  SpO2: 93% (03 Jan 2018 07:44) (90% - 95%)  [ x] room air   [ ] 02    PHYSICAL EXAM:  GENERAL:  No acute distress,  [ ] Agitated, [ ] Lethargy, [ ] confused   HEAD:  normal  ENMT: normal  NECK:  normal    NERVOUS SYSTEM:  Alert & Oriented X0, no focal deficits [x ]Confusion  [ ] Encephalopathic [ ] Sedated [ ] Other  CHEST/LUNG: Clear to auscultation bilaterally,  [ ] decreased breath sounds at bases  [ ] wheezing   [ ] rhonchi  [ ] crackles  HEART:  Regular rate and rhythm, No murmurs, rubs, or gallops,  [ ] irregular   ABDOMEN:  soft, nontender, nondistended, positive bowel sounds   [ ] obese  EXTREMITIES: No clubbing, cyanosis or edema  SKIN: [ ] venous stasis skin changes    LABS:                        10.1   6.0   )-----------( 124      ( 03 Jan 2018 07:34 )             30.7     03 Jan 2018 07:34    138    |  102    |  19     ----------------------------<  117    4.3     |  29     |  1.40     Ca    8.3        03 Jan 2018 07:34      PT/INR - ( 01 Jan 2018 11:52 )   PT: 12.7 sec;   INR: 1.16 ratio       PTT - ( 01 Jan 2018 11:52 )  PTT:30.7 sec    CAPILLARY BLOOD GLUCOSE    Cultures    RADIOLOGY & ADDITIONAL TESTS:    EXAM:  ECHO TTE W/O CON COMP W/DOPPLR    PROCEDURE DATE:  01/02/2018    INTERPRETATION:  Ordering Physician: DARYL A PERLMAN [id number]    Indication: Pulmonary embolism  Technologist Initials: PH  Study Quality: Technically fair   A complete echocardiographic study was performed utilizing standard   protocol including spectral and color Doppler in all echocardiographic   windows.    Height: 185 cm  Weight: 61 kg  BSA: 1.8 sq m  Blood Pressure: 110/54    MEASUREMENTS  IVS: 1.5cm  PWT: 1.4cm  LA: 3.9cm  AO: 2.2cm  LVIDd: 4.6cm  LVIDs: 2.9cm    LVEF: 55%    FINDINGS  Left Ventricle: Concentric left ventricular hypertrophy. The endocardium   is not well-visualized. Grossly, there appears to be preserved left   ventricular systolic function. Segmental wall motion suboptimally   assessed.  Aortic Valve: Bioprosthetic aortic valve replacement.  Mitral Valve: Mild mitral annular calcification and calcified mitral   valve leaflets. Mild mitral insufficiency.  Tricuspid Valve: Normal tricuspid valve. Minimal tricuspid insufficiency.  Pulmonic Valve: Not well visualized. Grossly normal.  Left Atrium: Mildly enlarged  Right Ventricle: Normal right ventricular size and systolic function. A   device wire is noted in the right heart.  Right Atrium: Normal  Diastolic Function: Grade 2 diastolic dysfunction.  Pericardium/Pleura: Normal pericardium with no pericardial effusion.    CONNIE VILLANUEVA M.D., ATTENDING CARDIOLOGIST  This document has been electronically signed. [place]  3 2018  7:13AM              Care Discussed with [X] Consultants  [x ] Patient  [x ] Family  [X]   /   [ ] Other; RN  DVT prophylaxis [ ] lovenox   [ ] subq heparin  [ ] coumadin  [ ] venodynes [ ] ambulating frequently at how risk for vte and no pharm         or  mechanical prophylaxis required    [ ] other   Advanced directive:    [ ]pt has hcp     [ ] pt declined to assign hcp  Discussed with pt @ bedside

## 2018-01-03 NOTE — DISCHARGE NOTE ADULT - HOSPITAL COURSE
85M with PMH of CAD s/p CABG, CHF (EF?), HTN, aortic stenosis s/p replacement, dementia presents with RUQ pain since this morning at 2 AM. History given by family who is at bedside as pt is poor historian. States that pt was experience intermittent stabbing pain in RUQ that caused him to wince in pain. No radiation of pain to back or sides. Pt states he also had chest pain when taking deep breaths. Denies fever, chills, SOB, n/v/d. Of note, pt family states that pt has not been taking his meds for a long time. Only takes lasix for swelling in legs.     In ED, vitals stable. Labs significant for H/H 11.9/36.7. D dimer 664. Troponin elevated at 0.062. EKG shows sinus with 1 degree av block. CXR negative. Xray abdomen shows no obstruction or free air. CTA chest and CT abdomen/pelvis shows RLL PE and cholelithiasis. Given asa and started on full dose lovenox. LE dopplers negative.     Patient seen by pulmonary, hematology and cardiology. Opted for 1-3 months of eliquis 5mg bid. Cardiac meds adjusted. He will f/u with hematology in 1 month. Follow-up with PCP in 1 week. Cleared by hematology, pulmonary and cardiology for discharge home.

## 2018-01-03 NOTE — DISCHARGE NOTE ADULT - PLAN OF CARE
Normal rate, regular rhythm, normal S1, S2 heart sounds heard. . Continue current medications as prescribed  Follow-up with your primary care doctor within 1 week.  Follow-up with Dr. Sanabria in 1 month.

## 2018-01-03 NOTE — PROGRESS NOTE ADULT - PROBLEM SELECTOR PLAN 3
on Lovenox  AC long term will be a risk in this patient with dementia and risk for falls  prognosis guarded  heme eval noted  RV assessment in progress  monitor vs and Sat and HD  will follow  out of bed to chair
- Resolved  - CT abdomen and Xray abd negative. Pain likely secondary to PE.
-Continue with namenda and aricept

## 2018-01-03 NOTE — PROGRESS NOTE ADULT - ASSESSMENT
85M with hx  CAD s/p CABG, CHF, HTN, aortic stenosis s/p replacement, dementia presents with RUQ abdomen pain since 2 AM day of admission.  CT angio chest, abdomen/pelvis sig for RLL pulm embolus and cholelithiasis  Wilfrid legs dopplers no DVT  No prior or family hx of DVT/PE  Started on Lovenox        several emboli   HAS BLED score is 3 (3.74 bleeds per 100 patient-years)    due to high fall risks would favor 3 months ac with 5 mg of Eliquis BID with close monitoring of bleeding   will re-eval in office as outpatient. 85M with hx  CAD s/p CABG, CHF, HTN, aortic stenosis s/p replacement, dementia presents with RUQ abdomen pain since 2 AM day of admission.  CT angio chest, abdomen/pelvis sig for RLL pulm embolus and cholelithiasis  Wilfrid legs dopplers no DVT  No prior or family hx of DVT/PE  Started on Lovenox        several emboli with sat in 90s ( unclear due to PE or basline)  debilitated due to dementia , however no h/o falls   I favor 3 months ac with 5 mg of Eliquis BID with close monitoring of bleeding   pros and cons of novel oral AC discussed with wife in length( no antidote available , bit no need to monitor for level, oral form)    will re-eval in office as outpatient.

## 2018-01-03 NOTE — PROGRESS NOTE ADULT - SUBJECTIVE AND OBJECTIVE BOX
All records reviewed.      No longer w RUQ pain  Vital Signs Last 24 Hrs  SpO2: 90 + on room air at rest        PHYSICAL EXAM:      General:well developed well nourished, in no acute distress, sl confused  Eyes:sclera anicteric, pupils equal and reactive to light  ENMT:buccal mucosa moist, pharynx not injected  Neck:supple, trachea midline  Lungs:clear, no wheeze/rhonchi  Cardiovascular:regular rate and rhythm, S1 S2  Abdomen:soft, nontender, no organomegaly present, bowel sounds normal  Neurological:alert and oriented x3, Cranial Nerves II-XII grossly intact  Skin:no increased ecchymosis/petechiae/purpura  Lymph Nodes:no palpable cervical/supraclavicular lymph nodes enlargements  Extremities:no cyanosis/clubbing/edema            LABS:    CBC Full  -  ( 03 Jan 2018 07:34 )  WBC Count : 6.0 K/uL  Hemoglobin : 10.1 g/dL  Hematocrit : 30.7 %  Platelet Count - Automated : 124 K/uL  Mean Cell Volume : 88.4 fl  Mean Cell Hemoglobin : 29.0 pg  Mean Cell Hemoglobin Concentration : 32.8 gm/dL      01-03    138  |  102  |  19  ----------------------------<  117<H>  4.3   |  29  |  1.40<H>    Ca    8.3<L>      03 Jan 2018 07:34                      RADIOLOGY & ADDITIONAL STUDIES:  < from: CT Angio Chest w/ IV Cont (01.01.18 @ 15:04) >  EXAM:  CT ABDOMEN AND PELVIS OC IC                          EXAM:  CT ANGIO CHEST (W)AW IC                            PROCEDURE DATE:  01/01/2018          INTERPRETATION:  CLINICAL INFORMATION: Chest pain, abdominal pain    COMPARISON: None    PROCEDURE:   CT Angiography of the Chest was performed followed by portal venous phase   imaging of the Abdomen and Pelvis.  95 ml of Omnipaque 350 was injected intravenously. 5 ml were discarded.  Sagittal and coronal reformats were performed as well as 3D (MIP)   reconstructions.    FINDINGS:    CHEST:     LUNGS AND LARGE AIRWAYS: Patent central airways. Right lower lobe   opacities. Bilateral dependent atelectasis.  PLEURA: Trace right pleural effusion.  VESSELS: Atherosclerotic changes of thoracicaorta and coronary arteries.   Pulmonary arterial filling defects identified within the right lower lobe.  HEART: Heart size is normal. Aortic valve prosthesis. No pericardial   effusion.  MEDIASTINUM AND EMMY: No lymphadenopathy.  CHEST WALL AND LOWER NECK: Status post sternotomy. Left anterior chest   wall cardiac conduction device with lead to the heart.    ABDOMEN AND PELVIS:    LIVER: Within normal limits.  BILE DUCTS: Normal caliber.  GALLBLADDER: Cholelithiasis.  SPLEEN: Within normal limits.  PANCREAS: Atrophic.  ADRENALS: Mild thickening.  KIDNEYS/URETERS: Subcentimeter hypodense renal lesions, too small to   further characterize. Renal cysts measuring up to 2.2 cm.    BLADDER: Within normal limits.  REPRODUCTIVE ORGANS: The prostateis enlarged.    BOWEL: No bowel obstruction. Appendix not visualized. Colonic   diverticulosis.  PERITONEUM: No ascites.  VESSELS:  Atherosclerotic changes.  RETROPERITONEUM: No lymphadenopathy.    ABDOMINAL WALL: Tiny fat-containing umbilical hernia.  BONES: Shoulder, hip, and spine degenerative changes.    IMPRESSION:     Positive for pulmonary emboli in the right lower lobe.  Right lower lobe opacities, which may represent infarct and/or pneumonia.  Cholelithiasis.    Findings were discussed with Dr. Martinez by telephone on 1/1/2018 at   1520 hours.                  CHEST:     LUNGS AND LARGE AIRWAYS: Patent central airways. Right lower lobe   opacities. Bilateral dependent atelectasis.  PLEURA: Trace right pleural effusion.  VESSELS: Atherosclerotic changes of thoracicaorta and coronary arteries.   Pulmonary arterial filling defects identified within the right lower lobe.  HEART: Heart size is normal. Aortic valve prosthesis. No pericardial   effusion.  MEDIASTINUM AND EMMY: No lymphadenopathy.  CHEST WALL AND LOWER NECK: Status post sternotomy. Left anterior chest   wall cardiac conduction device with lead to the heart.    ABDOMEN AND PELVIS:    LIVER: Within normal limits.  BILE DUCTS: Normal caliber.  GALLBLADDER: Cholelithiasis.  SPLEEN: Within normal limits.  PANCREAS: Atrophic.  ADRENALS: Mild thickening.  KIDNEYS/URETERS: Subcentimeter hypodense renal lesions, too small to   further characterize. Renal cysts measuring up to 2.2 cm.    BLADDER: Within normal limits.  REPRODUCTIVE ORGANS: The prostateis enlarged.    BOWEL: No bowel obstruction. Appendix not visualized. Colonic   diverticulosis.  PERITONEUM: No ascites.  VESSELS:  Atherosclerotic changes.  RETROPERITONEUM: No lymphadenopathy.    ABDOMINAL WALL: Tiny fat-containing umbilical hernia.  BONES: Shoulder, hip, and spine degenerative changes.    IMPRESSION:     Positive for pulmonary emboli in the right lower lobe.  Right lower lobe opacities, which may represent infarct and/or pneumonia.  Cholelithiasis.    Findings were discussed with Dr. Martinez by telephone on 1/1/2018 at   1520 hours.      < end of copied text >  < from: US Duplex Venous Lower Ext Complete, Bilateral (01.01.18 @ 19:29) >  EXAM:  US DPLX LWR EXT VEINS COMPL BI

## 2018-01-03 NOTE — DISCHARGE NOTE ADULT - MEDICATION SUMMARY - MEDICATIONS TO STOP TAKING
I will STOP taking the medications listed below when I get home from the hospital:    furosemide 40 mg oral tablet  -- 1 tab(s) by mouth once a day    Imdur 120 mg oral tablet, extended release  -- 1 tab(s) by mouth 2 times a day    Klor-Con M20 oral tablet, extended release  -- 1 tab(s) by mouth once a day    Brilinta (ticagrelor) 90 mg oral tablet  -- 1 tab(s) by mouth 2 times a day

## 2018-01-03 NOTE — DISCHARGE NOTE ADULT - MEDICATION SUMMARY - MEDICATIONS TO CHANGE
I will SWITCH the dose or number of times a day I take the medications listed below when I get home from the hospital:    atenolol 25 mg oral tablet  -- 1 tab(s) by mouth 2 times a day

## 2018-01-03 NOTE — PROGRESS NOTE ADULT - SUBJECTIVE AND OBJECTIVE BOX
Date/Time Patient Seen:  		  Referring MD:   Data Reviewed	       Patient is a 85y old  Male who presents with a chief complaint of Abdominal pain (01 Jan 2018 16:19)  in bed  seen and examined  vs and meds reviewed  on room air  on Lovenox      Subjective/HPI     PAST MEDICAL & SURGICAL HISTORY:  Aortic stenosis  CAD (coronary artery disease)  Anxiety  Temporal arteritis  Dementia  CHF (congestive heart failure)  HTN (hypertension)  Bilateral cataracts  History of prostate surgery  Aortic valve replaced  Cardiac defibrillator in place  H/O hernia repair  History of open heart surgery: triple bypass        Medication list         MEDICATIONS  (STANDING):  aspirin  chewable 81 milliGRAM(s) Oral daily  atorvastatin 40 milliGRAM(s) Oral at bedtime  cholecalciferol 1000 Unit(s) Oral daily  donepezil 10 milliGRAM(s) Oral at bedtime  enoxaparin Injectable 60 milliGRAM(s) SubCutaneous two times a day  famotidine    Tablet 20 milliGRAM(s) Oral daily  oxybutynin 10 milliGRAM(s) Oral daily  potassium chloride    Tablet ER 20 milliEquivalent(s) Oral daily  sertraline 100 milliGRAM(s) Oral daily    MEDICATIONS  (PRN):  ondansetron Injectable 4 milliGRAM(s) IV Push every 8 hours PRN Nausea and/or Vomiting         Vitals log        ICU Vital Signs Last 24 Hrs  T(C): 37.2 (03 Jan 2018 04:54), Max: 37.8 (02 Jan 2018 21:07)  T(F): 98.9 (03 Jan 2018 04:54), Max: 100 (02 Jan 2018 21:07)  HR: 72 (03 Jan 2018 04:54) (59 - 95)  BP: 110/64 (03 Jan 2018 04:54) (94/51 - 110/64)  BP(mean): --  ABP: --  ABP(mean): --  RR: 17 (03 Jan 2018 04:54) (16 - 18)  SpO2: 90% (03 Jan 2018 04:54) (90% - 95%)           Input and Output:  I&O's Detail    02 Jan 2018 07:01  -  03 Jan 2018 07:00  --------------------------------------------------------  IN:    Oral Fluid: 400 mL    sodium chloride 0.9%: 150 mL    Sodium Chloride 0.9% IV Bolus: 250 mL  Total IN: 800 mL    OUT:    Voided: 1 mL  Total OUT: 1 mL    Total NET: 799 mL          Lab Data                        11.9   7.7   )-----------( 119      ( 02 Jan 2018 05:52 )             38.3     01-02    140  |  103  |  11  ----------------------------<  104<H>  3.9   |  28  |  1.10    Ca    8.5      02 Jan 2018 05:52    TPro  6.8  /  Alb  3.2<L>  /  TBili  1.1  /  DBili  x   /  AST  20  /  ALT  15  /  AlkPhos  106  01-01      CARDIAC MARKERS ( 02 Jan 2018 05:52 )  .098 ng/mL / x     / 140 U/L / x     / 1.4 ng/mL  CARDIAC MARKERS ( 01 Jan 2018 21:04 )  .086 ng/mL / x     / 75 U/L / x     / 0.9 ng/mL  CARDIAC MARKERS ( 01 Jan 2018 12:21 )  .062 ng/mL / x     / 59 U/L / x     / 0.9 ng/mL        Review of Systems	      Objective     Physical Examination    head at  agitated overnight  confused  heart s1s2  lungs dec BS  abd soft      Pertinent Lab findings & Imaging      Monse:  NO   Adequate UO     I&O's Detail    02 Jan 2018 07:01  -  03 Jan 2018 07:00  --------------------------------------------------------  IN:    Oral Fluid: 400 mL    sodium chloride 0.9%: 150 mL    Sodium Chloride 0.9% IV Bolus: 250 mL  Total IN: 800 mL    OUT:    Voided: 1 mL  Total OUT: 1 mL    Total NET: 799 mL               Discussed with:     Cultures:	        Radiology

## 2018-01-03 NOTE — PROGRESS NOTE ADULT - PROBLEM SELECTOR PLAN 1
ashd  cad  assess RV - in the setting of new PE  on tele unit  ce serially noted  cardio following
- CTA shows RLL PE.   - LE dopplers negative b/l  - Continue full dose lovenox 60mg BID  - TTE: EF 55%, grade 2 diastolic dysfx, prosthetic a. valve, normal RV fx/size  - Troponin .062, .086, .098 today. As per cardio, elevation not consistent with ACS  - Cardio consulted (Dr. Monahan), recommend full dose lovenox, c/w aspirin 81mg, atenolol 25BID, lipitor 40 hs, lasix 40 qd, imdur 120 qd  - Pulmonology Consulted (Dr. Moon), recommend TTE, transition no NOAC prior to discharge, age appropriate cancer screenings.   - speech and swallow eval recommend dysphagia 2 thin liquid diet
- CTA shows RLL PE.   - LE dopplers negative b/l  - Continue full dose lovenox 60mg BID  - Troponin .062, .086, .098 today. As per cardio, elevation not consistent with ACS  - Cardio consulted (Dr. Monahan), recommend full dose lovenox, c/w aspirin 81mg, atenolol 25BID, lipitor 40 hs, lasix 40 qd, imdur 120 qd  - Pulmonology Consulted (Dr. Moon), recommend TTE, transition no NOAC prior to discharge, age appropriate cancer screenings.   - follow up TTE results  - speech and swallow eval recommend dysphagia 2 thin liquid diet

## 2018-01-03 NOTE — DISCHARGE NOTE ADULT - MEDICATION SUMMARY - MEDICATIONS TO TAKE
I will START or STAY ON the medications listed below when I get home from the hospital:    aspirin 81 mg oral tablet  -- 1 tab(s) by mouth once a day  -- Indication: For supplement    Eliquis 5 mg oral tablet  -- 1 tab(s) by mouth 2 times a day   -- Check with your doctor before becoming pregnant.  It is very important that you take or use this exactly as directed.  Do not skip doses or discontinue unless directed by your doctor.  Obtain medical advice before taking any non-prescription drugs as some may affect the action of this medication.    -- Indication: For Pulmonary embolism    Zoloft 100 mg oral tablet  -- 1 tab(s) by mouth once a day  -- Indication: For Dementia    atorvastatin 40 mg oral tablet  -- 1 tab(s) by mouth once a day  -- Indication: For High cholesterol    Zetia 10 mg oral tablet  -- 1 tab(s) by mouth once a day  -- Indication: For High cholesterol    atenolol 25 mg oral tablet  -- 1 tab(s) by mouth once a day  -- Indication: For HTN (hypertension)    Aricept 10 mg oral tablet  -- 1 tab(s) by mouth once a day (at bedtime)  -- Indication: For Dementia    Namenda XR 28 mg oral capsule, extended release  -- 1 cap(s) by mouth once a day  -- Indication: For Dementia    Detrol LA 4 mg oral capsule, extended release  -- 1 cap(s) by mouth once a day  -- Indication: For Bladder    Vitamin D3 1000 intl units oral capsule  -- 1 cap(s) by mouth once a day  -- Indication: For Need for prophylactic measure

## 2018-01-03 NOTE — PROGRESS NOTE ADULT - SUBJECTIVE AND OBJECTIVE BOX
HPI:  85M with PMH of CAD s/p CABG, CHF (EF?), HTN, aortic stenosis s/p replacement, dementia presents with RUQ pain since this morning at 2 AM. History given by family who is at bedside as pt is poor historian. States that pt was experience intermittent stabbing pain in RUQ that caused him to wince in pain. No radiation of pain to back or sides. Pt states he also had chest pain when taking deep breaths. Denies fever, chills, SOB, n/v/d. Of note, pt family states that pt has not been taking his meds for a long time. Only takes lasix for swelling in legs.     In ED, vitals stable. Labs significant for H/H 11.9/36.7. D dimer 664. Troponin elevated at 0.062. EKG shows sinus with 1 degree av block. CXR negative. Xray abdomen shows no obstruction or free air. CTA chest and CT abdomen/pelvis shows RLL PE and cholelithiasis. Given asa and started on full dose lovenox. LE dopplers pending. (01 Jan 2018 16:19)    SUBJECTIVE:  Patient is a 85y old  Male who presents with a chief complaint of Abdominal pain (01 Jan 2018 16:19)    Sitting on the chair, comfortable on RA.  Denies CP or abdominal pain    OBJECTIVE:  Review Of Systems:  Constitutional: [ ] Fever [ ] Chills [ ] Fatigue [ ] Weight change   HEENT: [ ] Blurred vision [ ] Eye Pain [ ] Headache [ ] Runny nose [ ] Sore Throat   Respiratory: [ ] Cough [ ] Wheezing [ ] Shortness of breath  Cardiovascular: [ ] Chest Pain [ ] Palpitations [ ] KELSEY [ ] PND [ ] Orthopnea  Gastrointestinal: [ ] Abdominal Pain [ ] Diarrhea [ ] Constipation [ ] Hemorrhoids [ ] Nausea [ ] Vomiting  Genitourinary: [ ] Nocturia [ ] Dysuria [ ] Incontinence  Extremities: [ ] Swelling [ ] Joint Pain  Neurologic: [ ] Focal deficit [ ] Paresthesias [ ] Syncope  Lymphatic: [ ] Swelling [ ] Lymphadenopathy   Skin: [ ] Rash [ ] Ecchymoses [ ] Wounds [ ] Lesions  Psychiatry: [ ] Depression [ ] Suicidal/Homicidal Ideation [ ] Anxiety [ ] Sleep Disturbances  [ x] 10 point review of systems is otherwise negative except as mentioned above            [ ]Unable to obtain    Allergy:  Allergies    No Known Allergies    Intolerances    Medications:  MEDICATIONS  (STANDING):  aspirin  chewable 81 milliGRAM(s) Oral daily  atorvastatin 40 milliGRAM(s) Oral at bedtime  cholecalciferol 1000 Unit(s) Oral daily  donepezil 10 milliGRAM(s) Oral at bedtime  enoxaparin Injectable 60 milliGRAM(s) SubCutaneous two times a day  famotidine    Tablet 20 milliGRAM(s) Oral daily  oxybutynin 10 milliGRAM(s) Oral daily  sertraline 100 milliGRAM(s) Oral daily    MEDICATIONS  (PRN):  ondansetron Injectable 4 milliGRAM(s) IV Push every 8 hours PRN Nausea and/or Vomiting    PMH/PSH/FH/SH: [ ] Unchanged    Vitals:  T(C): 36.4 (01-03-18 @ 11:28), Max: 37.8 (01-02-18 @ 21:07)  HR: 71 (01-03-18 @ 11:28) (59 - 95)  BP: 134/60 (01-03-18 @ 11:28) (94/51 - 134/60)  BP(mean): --  RR: 17 (01-03-18 @ 11:28) (16 - 18)  SpO2: 94% (01-03-18 @ 11:28) (90% - 95%)  Wt(kg): --  Daily     Daily   I&O's Summary    02 Jan 2018 07:01  -  03 Jan 2018 07:00  --------------------------------------------------------  IN: 800 mL / OUT: 1 mL / NET: 799 mL    Labs:                        10.1   6.0   )-----------( 124      ( 03 Jan 2018 07:34 )             30.7     01-03    138  |  102  |  19  ----------------------------<  117<H>  4.3   |  29  |  1.40<H>    Ca    8.3<L>      03 Jan 2018 07:34    TPro  6.8  /  Alb  3.2<L>  /  TBili  1.1  /  DBili  x   /  AST  20  /  ALT  15  /  AlkPhos  106  01-01    PT/INR - ( 01 Jan 2018 11:52 )   PT: 12.7 sec;   INR: 1.16 ratio      PTT - ( 01 Jan 2018 11:52 )  PTT:30.7 sec  CARDIAC MARKERS ( 02 Jan 2018 05:52 )  .098 ng/mL / x     / 140 U/L / x     / 1.4 ng/mL  CARDIAC MARKERS ( 01 Jan 2018 21:04 )  .086 ng/mL / x     / 75 U/L / x     / 0.9 ng/mL  CARDIAC MARKERS ( 01 Jan 2018 12:21 )  .062 ng/mL / x     / 59 U/L / x     / 0.9 ng/mL    ECG:  < from: 12 Lead ECG (01.01.18 @ 11:26) >    Ventricular Rate 96 BPM    Atrial Rate 96 BPM    P-R Interval 242 ms    QRS Duration 104 ms    Q-T Interval 350 ms    QTC Calculation(Bezet) 442 ms    P Axis 89 degrees    R Axis -47 degrees    T Axis 100 degrees    Diagnosis Line Sinus rhythm [id number] degree AV block  Left axis deviation  ST & T wave abnormality, consider lateral ischemia  Abnormal ECG  When compared with ECG of 10-DEC-2007 13:51,  [place] interval has increased  QRS axis shifted left  Confirmed by OSWADLO PLATA ([id number]) on 1/2/2018 8:52:05 PM    < end of copied text >    Echo:  < from: TTE Echo Doppler w/o Cont (01.02.18 @ 12:17) >     EXAM:  ECHO TTE W/O CON COMP W/DOPPLR         PROCEDURE DATE:  01/02/2018        INTERPRETATION:  Ordering Physician: DARYL A PERLMAN [id number]    Indication: Pulmonary embolism  Technologist Initials: PH  Study Quality: Technically fair   A complete echocardiographic study was performed utilizing standard   protocol including spectral and color Doppler in all echocardiographic   windows.    Height: 185 cm  Weight: 61 kg  BSA: 1.8 sq m  Blood Pressure: 110/54    MEASUREMENTS  IVS: 1.5cm  PWT:1.4cm  LA: 3.9cm  AO: 2.2cm  LVIDd: 4.6cm  LVIDs: 2.9cm    LVEF: 55%    FINDINGS  Left Ventricle: Concentric left ventricular hypertrophy. The endocardium   is not well-visualized. Grossly, there appears to be preserved left   ventricular systolic function. Segmental wall motion suboptimally   assessed.  Aortic Valve: Bioprosthetic aortic valve replacement.  Mitral Valve: Mild mitral annular calcification and calcified mitral   valve leaflets. Mild mitral insufficiency.  Tricuspid Valve: Normal tricuspid valve. Minimal tricuspid insufficiency.  Pulmonic Valve: Not well visualized. Grossly normal.  Left Atrium: Mildly enlarged  Right Ventricle: Normal right ventricular size and systolic function. A   device wire is noted in the right heart.  Right Atrium: Normal  Diastolic Function: Grade 2 diastolic dysfunction.  Pericardium/Pleura: Normal pericardium with no pericardial effusion.    CONNIE VILLANUEVA M.D., ATTENDING CARDIOLOGIST  This document has been electronically signed. [place]  3 2018  7:13AM      < end of copied text >    Stress Testing:     Cath:    Imaging:  < from: CT Angio Chest w/ IV Cont (01.01.18 @ 15:04) >    EXAM:  CT ABDOMEN AND PELVIS OC IC                          EXAM:  CT ANGIO CHEST (W)AW IC                            PROCEDURE DATE:  01/01/2018      INTERPRETATION:  CLINICAL INFORMATION: Chest pain, abdominal pain    COMPARISON: None    PROCEDURE:   CT Angiography of the Chest was performed followed by portal venous phase   imaging of the Abdomen and Pelvis.  95 ml of Omnipaque 350 was injected intravenously. 5 ml were discarded.  Sagittal and coronal reformats were performed as well as 3D (MIP)   reconstructions.    FINDINGS:    CHEST:     LUNGS AND LARGE AIRWAYS: Patent central airways. Right lower lobe   opacities. Bilateral dependent atelectasis.  PLEURA: Trace right pleural effusion.  VESSELS: Atherosclerotic changes of thoracicaorta and coronary arteries.   Pulmonary arterial filling defects identified within the right lower lobe.  HEART: Heart size is normal. Aortic valve prosthesis. No pericardial   effusion.  MEDIASTINUM AND EMMY: No lymphadenopathy.  CHEST WALL AND LOWER NECK: Status post sternotomy. Left anterior chest   wall cardiac conduction device with lead to the heart.    ABDOMEN AND PELVIS:    LIVER: Within normal limits.  BILE DUCTS: Normal caliber.  GALLBLADDER: Cholelithiasis.  SPLEEN: Within normal limits.  PANCREAS: Atrophic.  ADRENALS: Mild thickening.  KIDNEYS/URETERS: Subcentimeter hypodense renal lesions, too small to   further characterize. Renal cysts measuring up to 2.2 cm.    BLADDER: Within normal limits.  REPRODUCTIVE ORGANS: The prostateis enlarged.    BOWEL: No bowel obstruction. Appendix not visualized. Colonic   diverticulosis.  PERITONEUM: No ascites.  VESSELS:  Atherosclerotic changes.  RETROPERITONEUM: No lymphadenopathy.    ABDOMINAL WALL: Tiny fat-containing umbilical hernia.  BONES: Shoulder, hip, and spine degenerative changes.    IMPRESSION:     Positive for pulmonary emboli in the right lower lobe.  Right lower lobe opacities, which may represent infarct and/or pneumonia.  Cholelithiasis.    Findings were discussed with Dr. Martinez by telephone on 1/1/2018 at   1520 hours.    HIMA SOLIS M.D., ATTENDING RADIOLOGIST  This document has been electronically signed. Jan 1 2018  3:23PM      < end of copied text >    Interpretation of Telemetry:  NSR with PVC's    Physical Exam:  Appearance: [ x] Normal  [ ] abnormal [ x] NAD   Eyes: [ x] PERRL [ ] EOMI  HENT: [x ] Normal [ ] Abnormal oral muscosa [ ]NC/AT  Cardiovascular: [ x] S1 [x ] S2 [x ] RRR [ ] m/r/g [ ]edema [ ] JVP  Procedural Access Site: [ ]  hematoma [ ] tender to palpation [ ] 2+ pulse [ ] bruit [ ] Ecchymosis  Respiratory: [x ] Clear to auscultation bilaterally  Gastrointestinal: [x ] Soft [x ] tenderness[ ] distension [ x] BS  Musculoskeletal: [ ] clubbing [ ] joint deformity   Neurologic: [x ] Non-focal  Lymphatic: [ ] lymphadenopathy  Psychiatry: [ ] AAOx3  [x ] confused [ x] disoriented [x ] Mood & affect appropriate  Skin: [ ]  rashes [ ] ecchymoses [ ] cyanosis

## 2018-01-03 NOTE — DISCHARGE NOTE ADULT - CARE PROVIDER_API CALL
Jadon Muir), Internal Medicine  [address] [place] [address]  Phone: [phone]  Fax: [phone]    Quita Sanabria), Hematology; Medical Oncology  [address] [facility]  [address] [place] [address]  Phone: [phone]  Fax: [phone]

## 2018-01-03 NOTE — PROGRESS NOTE ADULT - PROBLEM SELECTOR PLAN 2
supportive care  ADL assist   pt is confused and agitated at times  caution with AC in a patient with dementia
- Cr on admission 1.2, now 1.4  - likely 2/2 emesis, dec. PO intake  - caution with IVF given hx of CHF  - encourage PO intake   - will monitor daily BMPs  - if continues to rise, will need to reevaluate fluids/lovenox
CT abdomen and Xray abd negative. Pain likely secondary to PE.

## 2018-01-03 NOTE — PROGRESS NOTE ADULT - ASSESSMENT
This is an 85M with PMH of CAD s/p CABGx 1 v in 2009, mm PCI procedures, CHF (EF?), ICD,  HTN, aortic stenosis s/p bioprosthetic aortic valve replacement, carotid disease s/p b/l CEA, dementia presents with RUQ pain, incidentally found to have RLL PE:    - Monitor telemetry for occult Afib  - CE not c/w ACS  - TTE done, no evidence of right heart strain  - Continue full dose of Lovenox to transition to NOAC  - Continue aspirin 81 qd and keep off of Brillinta  - Continue atenolol 25 BID  - Lipitor 40 hs  - Off Lasix. Appears euvolemic  - Please resume Imdur as BP now markedly improved  - Monitor and replete lytes; replete to keep K>4 and Mag>2  - Supplemental oxygen as needed  - Further cardiac workup will depend on clinical course.   - All other workup per primary team. Will followup.     Keily Toro NP  Cardiology This is an 85M with PMH of CAD s/p CABGx 1 v in 2009, mm PCI procedures, CHF (EF?), ICD,  HTN, aortic stenosis s/p bioprosthetic aortic valve replacement, carotid disease s/p b/l CEA, dementia presents with RUQ pain, incidentally found to have RLL PE:    - Monitor telemetry for occult Afib  - CE not c/w ACS  - TTE done, no evidence of right heart strain  - Continue full dose of Lovenox to transition to NOAC on discharge.  - Continue aspirin 81 qd and keep off of Brillinta  - Continue atenolol 25 BID  - Lipitor 40 hs  - Off Lasix. Appears euvolemic  - Can resume Imdur  - Monitor and replete lytes; replete to keep K>4 and Mag>2  - Supplemental oxygen as needed  - Further cardiac workup will depend on clinical course.   - All other workup per primary team. Will followup.     Keily Toro NP  Cardiology

## 2018-01-03 NOTE — DISCHARGE NOTE ADULT - CARE PLAN
Principal Discharge DX:	Other acute pulmonary embolism without acute cor pulmonale  Goal:	.  Instructions for follow-up, activity and diet:	Continue current medications as prescribed  Follow-up with your primary care doctor within 1 week.  Follow-up with Dr. Sanabria in 1 month.

## 2018-01-03 NOTE — DISCHARGE NOTE ADULT - PATIENT PORTAL LINK FT
“You can access the Follow[facility] Patient Portal, offered by [facility], by registering with the following website: [email or web address]”

## 2018-01-05 LAB
-  AMPICILLIN: SIGNIFICANT CHANGE UP
-  CIPROFLOXACIN: SIGNIFICANT CHANGE UP
-  NITROFURANTOIN: SIGNIFICANT CHANGE UP
-  TETRACYCLINE: SIGNIFICANT CHANGE UP
-  VANCOMYCIN: SIGNIFICANT CHANGE UP
CULTURE RESULTS: SIGNIFICANT CHANGE UP
METHOD TYPE: SIGNIFICANT CHANGE UP
ORGANISM # SPEC MICROSCOPIC CNT: SIGNIFICANT CHANGE UP
ORGANISM # SPEC MICROSCOPIC CNT: SIGNIFICANT CHANGE UP
SPECIMEN SOURCE: SIGNIFICANT CHANGE UP

## 2018-05-16 NOTE — PATIENT PROFILE ADULT. - PROVIDER NOTIFICATION PHONE
[id number] Advancement Flap (Double) Text: The defect edges were debeveled with a #15 scalpel blade.  Given the location of the defect and the proximity to free margins a double advancement flap was deemed most appropriate.  Using a sterile surgical marker, the appropriate advancement flaps were drawn incorporating the defect and placing the expected incisions within the relaxed skin tension lines where possible.    The area thus outlined was incised deep to adipose tissue with a #15 scalpel blade.  The skin margins were undermined to an appropriate distance in all directions utilizing iris scissors.

## 2018-12-18 NOTE — PATIENT PROFILE ADULT. - FUNCTIONAL SCREEN CURRENT LEVEL: AMBULATION, MLM
----- Message from Jorge Harley MD sent at 12/18/2018 12:48 PM CST -----  Please inform patient that their testing results were normal other than mild chronic gastritis without H. pylori as noted. Thanks.  
Letter mailed to pt with results.    
(2) assistive person

## 2018-12-25 ENCOUNTER — INPATIENT (INPATIENT)
Facility: HOSPITAL | Age: 83
LOS: 0 days | Discharge: HOME | DRG: 202 | End: 2018-12-26
Attending: INTERNAL MEDICINE | Admitting: INTERNAL MEDICINE
Payer: COMMERCIAL

## 2018-12-25 VITALS
HEART RATE: 35 BPM | OXYGEN SATURATION: 98 % | SYSTOLIC BLOOD PRESSURE: 95 MMHG | HEIGHT: 63 IN | WEIGHT: 119.93 LBS | RESPIRATION RATE: 15 BRPM | TEMPERATURE: 98 F | DIASTOLIC BLOOD PRESSURE: 54 MMHG

## 2018-12-25 DIAGNOSIS — I10 ESSENTIAL (PRIMARY) HYPERTENSION: ICD-10-CM

## 2018-12-25 DIAGNOSIS — Z95.2 PRESENCE OF PROSTHETIC HEART VALVE: Chronic | ICD-10-CM

## 2018-12-25 DIAGNOSIS — Z29.9 ENCOUNTER FOR PROPHYLACTIC MEASURES, UNSPECIFIED: ICD-10-CM

## 2018-12-25 DIAGNOSIS — I25.10 ATHEROSCLEROTIC HEART DISEASE OF NATIVE CORONARY ARTERY WITHOUT ANGINA PECTORIS: ICD-10-CM

## 2018-12-25 DIAGNOSIS — I21.4 NON-ST ELEVATION (NSTEMI) MYOCARDIAL INFARCTION: ICD-10-CM

## 2018-12-25 DIAGNOSIS — I50.32 CHRONIC DIASTOLIC (CONGESTIVE) HEART FAILURE: ICD-10-CM

## 2018-12-25 DIAGNOSIS — Z98.890 OTHER SPECIFIED POSTPROCEDURAL STATES: Chronic | ICD-10-CM

## 2018-12-25 DIAGNOSIS — R05 COUGH: ICD-10-CM

## 2018-12-25 DIAGNOSIS — R74.8 ABNORMAL LEVELS OF OTHER SERUM ENZYMES: ICD-10-CM

## 2018-12-25 DIAGNOSIS — H26.9 UNSPECIFIED CATARACT: Chronic | ICD-10-CM

## 2018-12-25 DIAGNOSIS — E78.5 HYPERLIPIDEMIA, UNSPECIFIED: ICD-10-CM

## 2018-12-25 DIAGNOSIS — G30.9 ALZHEIMER'S DISEASE, UNSPECIFIED: ICD-10-CM

## 2018-12-25 DIAGNOSIS — R53.1 WEAKNESS: ICD-10-CM

## 2018-12-25 DIAGNOSIS — Z95.810 PRESENCE OF AUTOMATIC (IMPLANTABLE) CARDIAC DEFIBRILLATOR: Chronic | ICD-10-CM

## 2018-12-25 PROBLEM — F41.9 ANXIETY DISORDER, UNSPECIFIED: Chronic | Status: ACTIVE | Noted: 2018-01-01

## 2018-12-25 PROBLEM — I35.0 NONRHEUMATIC AORTIC (VALVE) STENOSIS: Chronic | Status: ACTIVE | Noted: 2018-01-01

## 2018-12-25 LAB
ALBUMIN SERPL ELPH-MCNC: 3.3 G/DL — SIGNIFICANT CHANGE UP (ref 3.3–5)
ALP SERPL-CCNC: 95 U/L — SIGNIFICANT CHANGE UP (ref 40–120)
ALT FLD-CCNC: 15 U/L — SIGNIFICANT CHANGE UP (ref 12–78)
ANION GAP SERPL CALC-SCNC: 6 MMOL/L — SIGNIFICANT CHANGE UP (ref 5–17)
APTT BLD: 31.6 SEC — SIGNIFICANT CHANGE UP (ref 27.5–36.3)
AST SERPL-CCNC: 18 U/L — SIGNIFICANT CHANGE UP (ref 15–37)
BASOPHILS # BLD AUTO: 0.03 K/UL — SIGNIFICANT CHANGE UP (ref 0–0.2)
BASOPHILS NFR BLD AUTO: 0.4 % — SIGNIFICANT CHANGE UP (ref 0–2)
BILIRUB SERPL-MCNC: 1.5 MG/DL — HIGH (ref 0.2–1.2)
BUN SERPL-MCNC: 20 MG/DL — SIGNIFICANT CHANGE UP (ref 7–23)
CALCIUM SERPL-MCNC: 8.8 MG/DL — SIGNIFICANT CHANGE UP (ref 8.5–10.1)
CHLORIDE SERPL-SCNC: 105 MMOL/L — SIGNIFICANT CHANGE UP (ref 96–108)
CK SERPL-CCNC: 204 U/L — SIGNIFICANT CHANGE UP (ref 26–308)
CO2 SERPL-SCNC: 29 MMOL/L — SIGNIFICANT CHANGE UP (ref 22–31)
CREAT SERPL-MCNC: 1.4 MG/DL — HIGH (ref 0.5–1.3)
D DIMER BLD IA.RAPID-MCNC: 476 NG/ML DDU — HIGH
EOSINOPHIL # BLD AUTO: 0.02 K/UL — SIGNIFICANT CHANGE UP (ref 0–0.5)
EOSINOPHIL NFR BLD AUTO: 0.3 % — SIGNIFICANT CHANGE UP (ref 0–6)
GLUCOSE SERPL-MCNC: 91 MG/DL — SIGNIFICANT CHANGE UP (ref 70–99)
HCT VFR BLD CALC: 35.4 % — LOW (ref 39–50)
HGB BLD-MCNC: 11.4 G/DL — LOW (ref 13–17)
IMM GRANULOCYTES NFR BLD AUTO: 0.4 % — SIGNIFICANT CHANGE UP (ref 0–1.5)
INR BLD: 1.31 RATIO — HIGH (ref 0.88–1.16)
LACTATE SERPL-SCNC: 1.8 MMOL/L — SIGNIFICANT CHANGE UP (ref 0.7–2)
LIDOCAIN IGE QN: 99 U/L — SIGNIFICANT CHANGE UP (ref 73–393)
LYMPHOCYTES # BLD AUTO: 0.55 K/UL — LOW (ref 1–3.3)
LYMPHOCYTES # BLD AUTO: 8.1 % — LOW (ref 13–44)
MAGNESIUM SERPL-MCNC: 2.1 MG/DL — SIGNIFICANT CHANGE UP (ref 1.6–2.6)
MCHC RBC-ENTMCNC: 28.4 PG — SIGNIFICANT CHANGE UP (ref 27–34)
MCHC RBC-ENTMCNC: 32.2 GM/DL — SIGNIFICANT CHANGE UP (ref 32–36)
MCV RBC AUTO: 88.1 FL — SIGNIFICANT CHANGE UP (ref 80–100)
MONOCYTES # BLD AUTO: 0.75 K/UL — SIGNIFICANT CHANGE UP (ref 0–0.9)
MONOCYTES NFR BLD AUTO: 11.1 % — SIGNIFICANT CHANGE UP (ref 2–14)
NEUTROPHILS # BLD AUTO: 5.37 K/UL — SIGNIFICANT CHANGE UP (ref 1.8–7.4)
NEUTROPHILS NFR BLD AUTO: 79.7 % — HIGH (ref 43–77)
NRBC # BLD: 0 /100 WBCS — SIGNIFICANT CHANGE UP (ref 0–0)
NT-PROBNP SERPL-SCNC: 5405 PG/ML — HIGH (ref 0–450)
PLATELET # BLD AUTO: 116 K/UL — LOW (ref 150–400)
POTASSIUM SERPL-MCNC: 4.1 MMOL/L — SIGNIFICANT CHANGE UP (ref 3.5–5.3)
POTASSIUM SERPL-SCNC: 4.1 MMOL/L — SIGNIFICANT CHANGE UP (ref 3.5–5.3)
PROT SERPL-MCNC: 6.9 G/DL — SIGNIFICANT CHANGE UP (ref 6–8.3)
PROTHROM AB SERPL-ACNC: 14.9 SEC — HIGH (ref 10–12.9)
RBC # BLD: 4.02 M/UL — LOW (ref 4.2–5.8)
RBC # FLD: 14.4 % — SIGNIFICANT CHANGE UP (ref 10.3–14.5)
SODIUM SERPL-SCNC: 140 MMOL/L — SIGNIFICANT CHANGE UP (ref 135–145)
TROPONIN I SERPL-MCNC: 0.06 NG/ML — HIGH (ref 0.01–0.04)
TROPONIN I SERPL-MCNC: 0.07 NG/ML — HIGH (ref 0.01–0.04)
TSH SERPL-MCNC: 0.68 UIU/ML — SIGNIFICANT CHANGE UP (ref 0.36–3.74)
WBC # BLD: 6.75 K/UL — SIGNIFICANT CHANGE UP (ref 3.8–10.5)
WBC # FLD AUTO: 6.75 K/UL — SIGNIFICANT CHANGE UP (ref 3.8–10.5)

## 2018-12-25 PROCEDURE — 99285 EMERGENCY DEPT VISIT HI MDM: CPT

## 2018-12-25 PROCEDURE — 71045 X-RAY EXAM CHEST 1 VIEW: CPT | Mod: 26

## 2018-12-25 PROCEDURE — 93010 ELECTROCARDIOGRAM REPORT: CPT

## 2018-12-25 PROCEDURE — 71275 CT ANGIOGRAPHY CHEST: CPT | Mod: 26

## 2018-12-25 RX ORDER — ATORVASTATIN CALCIUM 80 MG/1
1 TABLET, FILM COATED ORAL
Qty: 0 | Refills: 0 | COMMUNITY

## 2018-12-25 RX ORDER — SERTRALINE 25 MG/1
1 TABLET, FILM COATED ORAL
Qty: 0 | Refills: 0 | COMMUNITY

## 2018-12-25 RX ORDER — ASPIRIN/CALCIUM CARB/MAGNESIUM 324 MG
81 TABLET ORAL DAILY
Qty: 0 | Refills: 0 | Status: DISCONTINUED | OUTPATIENT
Start: 2018-12-25 | End: 2018-12-26

## 2018-12-25 RX ORDER — MEMANTINE HYDROCHLORIDE 10 MG/1
1 TABLET ORAL
Qty: 0 | Refills: 0 | COMMUNITY

## 2018-12-25 RX ORDER — DONEPEZIL HYDROCHLORIDE 10 MG/1
1 TABLET, FILM COATED ORAL
Qty: 0 | Refills: 0 | COMMUNITY

## 2018-12-25 RX ORDER — TICAGRELOR 90 MG/1
90 TABLET ORAL
Qty: 0 | Refills: 0 | Status: DISCONTINUED | OUTPATIENT
Start: 2018-12-25 | End: 2018-12-26

## 2018-12-25 RX ORDER — SODIUM CHLORIDE 9 MG/ML
1000 INJECTION INTRAMUSCULAR; INTRAVENOUS; SUBCUTANEOUS
Qty: 0 | Refills: 0 | Status: DISCONTINUED | OUTPATIENT
Start: 2018-12-25 | End: 2018-12-26

## 2018-12-25 RX ORDER — MEMANTINE HYDROCHLORIDE 10 MG/1
10 TABLET ORAL
Qty: 0 | Refills: 0 | Status: DISCONTINUED | OUTPATIENT
Start: 2018-12-25 | End: 2018-12-26

## 2018-12-25 RX ORDER — CHOLECALCIFEROL (VITAMIN D3) 125 MCG
1 CAPSULE ORAL
Qty: 0 | Refills: 0 | COMMUNITY

## 2018-12-25 RX ORDER — EZETIMIBE 10 MG/1
1 TABLET ORAL
Qty: 0 | Refills: 0 | COMMUNITY

## 2018-12-25 RX ORDER — ATENOLOL 25 MG/1
1 TABLET ORAL
Qty: 0 | Refills: 0 | COMMUNITY

## 2018-12-25 RX ORDER — APIXABAN 2.5 MG/1
1 TABLET, FILM COATED ORAL
Qty: 0 | Refills: 0 | COMMUNITY

## 2018-12-25 RX ORDER — ATORVASTATIN CALCIUM 80 MG/1
40 TABLET, FILM COATED ORAL AT BEDTIME
Qty: 0 | Refills: 0 | Status: DISCONTINUED | OUTPATIENT
Start: 2018-12-25 | End: 2018-12-26

## 2018-12-25 RX ORDER — ASPIRIN/CALCIUM CARB/MAGNESIUM 324 MG
1 TABLET ORAL
Qty: 0 | Refills: 0 | COMMUNITY

## 2018-12-25 RX ORDER — SERTRALINE 25 MG/1
50 TABLET, FILM COATED ORAL DAILY
Qty: 0 | Refills: 0 | Status: DISCONTINUED | OUTPATIENT
Start: 2018-12-25 | End: 2018-12-26

## 2018-12-25 RX ORDER — ATENOLOL 25 MG/1
25 TABLET ORAL DAILY
Qty: 0 | Refills: 0 | Status: DISCONTINUED | OUTPATIENT
Start: 2018-12-25 | End: 2018-12-26

## 2018-12-25 RX ORDER — HALOPERIDOL DECANOATE 100 MG/ML
0.5 INJECTION INTRAMUSCULAR EVERY 6 HOURS
Qty: 0 | Refills: 0 | Status: DISCONTINUED | OUTPATIENT
Start: 2018-12-25 | End: 2018-12-26

## 2018-12-25 RX ORDER — DONEPEZIL HYDROCHLORIDE 10 MG/1
10 TABLET, FILM COATED ORAL AT BEDTIME
Qty: 0 | Refills: 0 | Status: DISCONTINUED | OUTPATIENT
Start: 2018-12-25 | End: 2018-12-26

## 2018-12-25 RX ORDER — CHOLECALCIFEROL (VITAMIN D3) 125 MCG
1000 CAPSULE ORAL DAILY
Qty: 0 | Refills: 0 | Status: DISCONTINUED | OUTPATIENT
Start: 2018-12-25 | End: 2018-12-26

## 2018-12-25 RX ORDER — HEPARIN SODIUM 5000 [USP'U]/ML
5000 INJECTION INTRAVENOUS; SUBCUTANEOUS EVERY 12 HOURS
Qty: 0 | Refills: 0 | Status: DISCONTINUED | OUTPATIENT
Start: 2018-12-25 | End: 2018-12-26

## 2018-12-25 RX ORDER — TOLTERODINE TARTRATE 1 MG/1
1 TABLET, FILM COATED ORAL
Qty: 0 | Refills: 0 | COMMUNITY

## 2018-12-25 RX ADMIN — SODIUM CHLORIDE 50 MILLILITER(S): 9 INJECTION INTRAMUSCULAR; INTRAVENOUS; SUBCUTANEOUS at 18:55

## 2018-12-25 RX ADMIN — HEPARIN SODIUM 5000 UNIT(S): 5000 INJECTION INTRAVENOUS; SUBCUTANEOUS at 18:10

## 2018-12-25 RX ADMIN — ATORVASTATIN CALCIUM 40 MILLIGRAM(S): 80 TABLET, FILM COATED ORAL at 21:25

## 2018-12-25 RX ADMIN — MEMANTINE HYDROCHLORIDE 10 MILLIGRAM(S): 10 TABLET ORAL at 18:09

## 2018-12-25 RX ADMIN — DONEPEZIL HYDROCHLORIDE 10 MILLIGRAM(S): 10 TABLET, FILM COATED ORAL at 21:25

## 2018-12-25 RX ADMIN — TICAGRELOR 90 MILLIGRAM(S): 90 TABLET ORAL at 18:09

## 2018-12-25 NOTE — H&P ADULT - PROBLEM SELECTOR PLAN 1
Admit to tele  Patient found to have elevated troponin (.066), which is lower compared to previous admission (0.98 <--.086).    Doubt ACS  Trend enzymes  Cardio consult  Further work-up/management pending clinical course.

## 2018-12-25 NOTE — H&P ADULT - HISTORY OF PRESENT ILLNESS
87 y/o male with Hx og CAD s/p CABG, bioAVR, PE, HTN, HPfEF s/p ICD, and dementia brought to the ED by family from Urgent Care for weakness and productive cough that started yesterday.  In the urgent care clinic, they were not able to obtain pulse oximeter.  Patient unable to give ROS, however, family at bedside states that he has been weak since yesterday and barely able to walk but actually slight better today.  Of note, per spouse, patient attempted to urinate this am but unable to. Patient has not been taking his eliquis or any of his other meds for several months. Family says he spits out all his meds so they stopped giving them. Per family, patient never had fever, chills, SOB, KELSEY, diarrhea, constipation, or sick contacts.    Of note: After he was diagnosed with PE on 1/1/18 and started on Eliquis, his cardiologist stopped his Isosorbide 120, lasix 40, and Brilinta 90.    Per family, ICD interrogated beginning of December and were told no events. 85 y/o male with Hx og CAD s/p CABG, bioAVR, PE, HTN, HPfEF s/p ICD, and dementia brought to the ED by family from Urgent Care for weakness and productive cough that started yesterday.  In the urgent care clinic, they were not able to obtain pulse oximeter.  Patient unable to give ROS, however, family at bedside states that he has been weak since yesterday and barely able to walk but actually slight better today.  Of note, per spouse, patient attempted to urinate this am but unable to. Patient has not been taking his eliquis or any of his other meds for several months. Family says he spits out all his meds so they stopped giving them. Per family, patient never had fever, chills, SOB, KELSEY, diarrhea, constipation, or sick contacts.    Of note: After he was diagnosed with PE on 1/1/18 and started on Eliquis, his cardiologist stopped his Isosorbide 120 bid, lasix 40 qd, and Brilinta 90 bid    Per family, ICD interrogated beginning of December and were told no events. 85 y/o male with Hx og CAD s/p CABG, bioAVR, PE, HTN, HPfEF s/p ICD, and dementia brought to the ED by family from Urgent Care for weakness and productive cough that started yesterday.  In the urgent care clinic, they were not able to obtain pulse oximeter.  Patient unable to give ROS, however, family at bedside states that he has been weak since yesterday and barely able to walk but actually slightly better today.  Patient has not been taking his eliquis nor any of his other meds for several months. Family says he spits out all his meds so they stopped giving them. Per family, patient never had fever, chills, SOB, KELSEY, diarrhea, constipation, or sick contacts.    Of note: After he was diagnosed with PE on 1/1/18 and started on Eliquis, his cardiologist stopped his Isosorbide 120 bid, lasix 40 qd, and Brilinta 90 bid    Per family, ICD interrogated beginning of December and were told no events. 87 y/o male with Hx og CAD s/p CABG, bioAVR, PE, HTN, HPfEF s/p ICD, and dementia brought to the ED by family from Urgent Care for weakness and productive cough that started yesterday.  In the urgent care clinic, they were not able to obtain pulse oximeter.  Patient unable to give ROS, however, family at bedside states that he has been weak since yesterday and barely able to walk but actually slightly better today.  Patient has not been taking his eliquis nor any of his other meds for several months. Family says he spits out all his meds so they stopped giving them. Per family, patient never had fever, chills, SOB, KELSEY, diarrhea, constipation, or sick contacts.    Of note: After he was diagnosed with PE on 1/1/18 and started on Eliquis, then his cardiologist stopped his Isosorbide 120 bid, lasix 40 qd, and Brilinta 90 bid    Per family, ICD interrogated beginning of December and were told no events.

## 2018-12-25 NOTE — ED ADULT NURSE REASSESSMENT NOTE - NS ED NURSE REASSESS COMMENT FT1
pt to be admitted, baseline mental status as per wife, no complaints, VSS, NSR on monitor with PVC's presents, 20 g lac, resp even and unlabored. report given to kee ray

## 2018-12-25 NOTE — H&P ADULT - PROBLEM SELECTOR PLAN 7
Continue aspirin   Restart brilinta  Continue beta-blocker and statin Continue aspirin   Restart brilinta  Continue beta-blocker and statin  Hold imdur for now (d/c'd 1 year ago)

## 2018-12-25 NOTE — H&P ADULT - PROBLEM SELECTOR PLAN 9
Patient with h/o PE  No evidence of PE on CTA  GI/DVT prophylaxis only Patient with h/o PE  No evidence of PE on CTA -- will not restart eliquis (almost 1 year since PE)  DVT prophylaxis only

## 2018-12-25 NOTE — ED PROVIDER NOTE - PROGRESS NOTE DETAILS
seen by cardiology np who will get details of ppm ppm evaluated: no arrhythmia no dysfunction the ekg is thus consistent with sinus bradycardia and frequent ventricular pvc

## 2018-12-25 NOTE — CONSULT NOTE ADULT - ATTENDING COMMENTS
Seen/examined. Agree with above.  Mild troponin leak noted, though doubt ACS.  Echocardiogram pending  Clarify home medications.  Will follow with you

## 2018-12-25 NOTE — ED ADULT NURSE NOTE - OBJECTIVE STATEMENT
85 y/o male with CAD, dementia, and aortic stenosis presents to the ed from urgent care. pt was at urgent care for eval of cough and they were unabloe to obtain HR and O2sat. he denies nausea vomiting fever chills chest pain headache abdominal pain and urinary problems. pt poor historian. family at bedside. pt with hx of a PE, non compliant with elequis. family reports pink itnged sputum

## 2018-12-25 NOTE — ED ADULT TRIAGE NOTE - CHIEF COMPLAINT QUOTE
cough and congestion x 2 days. patient was seen at urgent care prior to arrival and was referred to ED to rule out pneumonia or PE. patient with history of PE, not taking daily blood thinners. rapid flu at urgent care negative

## 2018-12-25 NOTE — ED ADULT NURSE NOTE - NSIMPLEMENTINTERV_GEN_ALL_ED
Implemented All Fall Risk Interventions:  [place] to call system. Call bell, personal items and telephone within reach. Instruct patient to call for assistance. Room bathroom lighting operational. Non-slip footwear when patient is off stretcher. Physically safe environment: no spills, clutter or unnecessary equipment. Stretcher in lowest position, wheels locked, appropriate side rails in place. Provide visual cue, wrist band, yellow gown, etc. Monitor gait and stability. Monitor for mental status changes and reorient to person, place, and time. Review medications for side effects contributing to fall risk. Reinforce activity limits and safety measures with patient and family.

## 2018-12-25 NOTE — CONSULT NOTE ADULT - SUBJECTIVE AND OBJECTIVE BOX
[facility] - Nadeen Sneed, Dayana, Violeta, Mack Gabriel  Office Number: [phone]    Initial Consult Note    CHIEF COMPLAINT: Patient is a 86y old  Male who presents with a chief complaint of     HPI: 85 y/o male with Hx og CAD s/p CABG, bioAVR, PE, HTN, HPfEF s/p ICD, and dementia brought to the ED by family from Urgent Care for weakness and productive cough that started yesterday.  In the urgent care clinic, they were not able to obtain pulse oximeter.  Patient unable to give ROS, however, family at bedside states that he has been weak since yesterday and barely able to walk.  Of note, per spouse, patient attempted to urinate this am but unable to.  Called to evaluate patient for elevated troponin (.066), which is lower compared to previous admission (0.98 <--.086).  Pro-BNP=>5000, however, no evidence of volume overload and CxR showed clear lungs.  Family states he has not been taking his Eliquis for several months and his private Cardio, Dr. Bong Meyer ([facility]), ia aware.  Of note, per family, PPM/ICD interrogated beginning of December and were told no events.     PAST MEDICAL & SURGICAL HISTORY:  Aortic stenosis  CAD (coronary artery disease)  Anxiety  Temporal arteritis  Dementia  CHF (congestive heart failure)  HTN (hypertension)  Bilateral cataracts  History of prostate surgery  Aortic valve replaced  Cardiac defibrillator in place  H/O hernia repair  History of open heart surgery: triple bypass    SOCIAL HISTORY:  No tobacco, ethanol, or drug abuse.    FAMILY HISTORY:  No pertinent family history in first degree relatives    No family history of acute MI or sudden cardiac death.    MEDICATIONS  (STANDING):    MEDICATIONS  (PRN):    Allergies    No Known Allergies    Intolerances    REVIEW OF SYSTEMS:    CONSTITUTIONAL: No weakness, fevers or chills  EYES/ENT: No visual changes;  No vertigo or throat pain   NECK: No pain or stiffness  RESPIRATORY: No cough, wheezing, hemoptysis; No shortness of breath  CARDIOVASCULAR: No chest pain or palpitations  GASTROINTESTINAL: No abdominal pain. No nausea, vomiting, or hematemesis; No diarrhea or constipation. No melena or hematochezia.  GENITOURINARY: No dysuria, frequency or hematuria  NEUROLOGICAL: No numbness or weakness  SKIN: No itching or rash  All other review of systems is negative unless indicated above    VITAL SIGNS:   Vital Signs Last 24 Hrs  T(C): 36.7 (25 Dec 2018 14:33), Max: 36.7 (25 Dec 2018 14:33)  T(F): 98 (25 Dec 2018 14:33), Max: 98 (25 Dec 2018 14:33)  HR: 69 (25 Dec 2018 14:33) (35 - 72)  BP: 120/30 (25 Dec 2018 14:33) (95/54 - 120/30)  BP(mean): --  RR: 15 (25 Dec 2018 14:33) (15 - 16)  SpO2: 96% (25 Dec 2018 14:33) (95% - 98%)    I&O's Summary    On Exam:    Constitutional: NAD, alert but confused   Lungs:  Non-labored, breath sounds are clear bilaterally, No wheezing, rales or rhonchi  Cardiovascular: RRR.  S1 and S2 positive.  No murmurs, rubs, gallops or clicks  Gastrointestinal: Bowel Sounds present, soft, nontender.   Lymph: No peripheral edema. No cervical lymphadenopathy.  Neurological: Alert, no focal deficits  Skin: No rashes or ulcers   Psych:  Mood & affect flat    LABS: All Labs Reviewed:                        11.4   6.75  )-----------( 116      ( 25 Dec 2018 13:55 )             35.4     25 Dec 2018 13:55    140    |  105    |  20     ----------------------------<  91     4.1     |  29     |  1.40     Ca    8.8        25 Dec 2018 13:55  Mg     2.1       25 Dec 2018 13:55    TPro  6.9    /  Alb  3.3    /  TBili  1.5    /  DBili  x      /  AST  18     /  ALT  15     /  AlkPhos  95     25 Dec 2018 13:55    PT/INR - ( 25 Dec 2018 13:55 )   PT: 14.9 sec;   INR: 1.31 ratio      PTT - ( 25 Dec 2018 13:55 )  PTT:31.6 sec  CARDIAC MARKERS ( 25 Dec 2018 13:55 )  .066 ng/mL / x     / x     / x     / x        Blood Culture:   12-25 @ 13:55  Pro Bnp [id number]    12-25 @ 13:55  TSH: 0.68    RADIOLOGY:  < from: Xray Chest 1 View AP/PA (12.25.18 @ 13:44) >    EXAM:  XR CHEST AP OR PA 1V                          PROCEDURE DATE:  12/25/2018      INTERPRETATION:  Clinical information: Chest pain    Comparison study dated 1/1/2018    Portable exam, 1:30 PM    Cardiac monitor leads present. Cardiac device overlies left mid thorax.   Sternal sutures-mediastinal clips present. Coronary artery stent visible.    Surgical clips present left side of the neck. Tiny surgical clips present   right side of the neck.    Clear lungs. No sign of lobar consolidation vascular congestion or   effusion. Heart size within normal limits. No acute osseous abnormalities.    IMPRESSION:    See above report    CEE ARDON M.D.,ATTENDING RADIOLOGIST  This document has been electronically signed. Dec 25 2018  1:58PM      < end of copied text >    EKG:  NSR, rate 60's with ventricular bigeminy with ST depression in the V leads (chronic, per records)    < from: TTE Echo Doppler w/o Cont (01.02.18 @ 12:17) >     EXAM:  ECHO TTE W/O CON COMP W/DOPPLR         PROCEDURE DATE:  01/02/2018        INTERPRETATION:  Ordering Physician: DARYL A PERLMAN [id number]    Indication: Pulmonary embolism  Technologist Initials: PH  Study Quality: Technically fair   A complete echocardiographic study was performed utilizing standard   protocol including spectral and color Doppler in all echocardiographic   windows.    Height: 185 cm  Weight: 61 kg  BSA: 1.8 sq m  Blood Pressure: 110/54    MEASUREMENTS  IVS: 1.5cm  PWT:1.4cm  LA: 3.9cm  AO: 2.2cm  LVIDd: 4.6cm  LVIDs: 2.9cm    LVEF: 55%    FINDINGS  Left Ventricle: Concentric left ventricular hypertrophy. The endocardium   is not well-visualized. Grossly, there appears to be preserved left   ventricular systolic function. Segmental wall motion suboptimally   assessed.  Aortic Valve: Bioprosthetic aortic valve replacement.  Mitral Valve: Mild mitral annular calcification and calcified mitral   valve leaflets. Mild mitral insufficiency.  Tricuspid Valve: Normal tricuspid valve. Minimal tricuspid insufficiency.  Pulmonic Valve: Not well visualized. Grossly normal.  Left Atrium: Mildly enlarged  Right Ventricle: Normal right ventricular size and systolic function. A   device wire is noted in the right heart.  Right Atrium: Normal  Diastolic Function: Grade 2 diastolic dysfunction.  Pericardium/Pleura: Normal pericardium with no pericardial effusion.    CONNIE VILLANUEVA M.D., ATTENDING CARDIOLOGIST  This document has been electronically signed. Cheko  3 2018  7:13AM      < end of copied text > [facility] - Nadeen Sneed, Dayana, Violeta, Mack Gabriel  Office Number: [phone]    Initial Consult Note    CHIEF COMPLAINT: Patient is a 86y old  Male who presents with a chief complaint of     HPI: 85 y/o male with Hx og CAD s/p CABG, bioAVR, PE, HTN, HPfEF s/p ICD, and dementia brought to the ED by family from Urgent Care for weakness and productive cough that started yesterday.  In the urgent care clinic, they were not able to obtain pulse oximeter.  Patient unable to give ROS, however, family at bedside states that he has been weak since yesterday and barely able to walk.  Of note, per spouse, patient attempted to urinate this am but unable to.  Called to evaluate patient for elevated troponin (.066), which is lower compared to previous admission (0.98 <--.086).  Pro-BNP=>5000, however, no evidence of volume overload and CxR showed clear lungs.  Family states he has not been taking his Eliquis for several months and his private Cardio, Dr. Bong Meyer ([facility]), ia aware.  Of note, per family, ICD interrogated beginning of December and were told no events.  Per family, patient never had fever, chills, SOB, KELSEY, diarrhea, constipation, or sick contacts.    PAST MEDICAL & SURGICAL HISTORY:  Aortic stenosis  CAD (coronary artery disease)  Anxiety  Temporal arteritis  Dementia  CHF (congestive heart failure)  HTN (hypertension)  Bilateral cataracts  History of prostate surgery  Aortic valve replaced  Cardiac defibrillator in place  H/O hernia repair  History of open heart surgery: triple bypass    SOCIAL HISTORY:  No tobacco, ethanol, or drug abuse.    FAMILY HISTORY:  No pertinent family history in first degree relatives    No family history of acute MI or sudden cardiac death.    MEDICATIONS  (STANDING):    MEDICATIONS  (PRN):    Allergies    No Known Allergies    Intolerances    REVIEW OF SYSTEMS:    CONSTITUTIONAL: No weakness, fevers or chills  EYES/ENT: No visual changes;  No vertigo or throat pain   NECK: No pain or stiffness  RESPIRATORY: No cough, wheezing, hemoptysis; No shortness of breath  CARDIOVASCULAR: No chest pain or palpitations  GASTROINTESTINAL: No abdominal pain. No nausea, vomiting, or hematemesis; No diarrhea or constipation. No melena or hematochezia.  GENITOURINARY: No dysuria, frequency or hematuria  NEUROLOGICAL: No numbness or weakness  SKIN: No itching or rash  All other review of systems is negative unless indicated above    VITAL SIGNS:   Vital Signs Last 24 Hrs  T(C): 36.7 (25 Dec 2018 14:33), Max: 36.7 (25 Dec 2018 14:33)  T(F): 98 (25 Dec 2018 14:33), Max: 98 (25 Dec 2018 14:33)  HR: 69 (25 Dec 2018 14:33) (35 - 72)  BP: 120/30 (25 Dec 2018 14:33) (95/54 - 120/30)  BP(mean): --  RR: 15 (25 Dec 2018 14:33) (15 - 16)  SpO2: 96% (25 Dec 2018 14:33) (95% - 98%)    I&O's Summary    On Exam:    Constitutional: NAD, alert but confused   Lungs:  Non-labored, breath sounds are clear bilaterally, No wheezing, rales or rhonchi  Cardiovascular: RRR.  S1 and S2 positive.  No murmurs, rubs, gallops or clicks  Gastrointestinal: Bowel Sounds present, soft, nontender.   Lymph: No peripheral edema. No cervical lymphadenopathy.  Neurological: Alert, no focal deficits  Skin: No rashes or ulcers   Psych:  Mood & affect flat    LABS: All Labs Reviewed:                        11.4   6.75  )-----------( 116      ( 25 Dec 2018 13:55 )             35.4     25 Dec 2018 13:55    140    |  105    |  20     ----------------------------<  91     4.1     |  29     |  1.40     Ca    8.8        25 Dec 2018 13:55  Mg     2.1       25 Dec 2018 13:55    TPro  6.9    /  Alb  3.3    /  TBili  1.5    /  DBili  x      /  AST  18     /  ALT  15     /  AlkPhos  95     25 Dec 2018 13:55    PT/INR - ( 25 Dec 2018 13:55 )   PT: 14.9 sec;   INR: 1.31 ratio      PTT - ( 25 Dec 2018 13:55 )  PTT:31.6 sec  CARDIAC MARKERS ( 25 Dec 2018 13:55 )  .066 ng/mL / x     / x     / x     / x        Blood Culture:   12-25 @ 13:55  Pro Bnp [id number]    12-25 @ 13:55  TSH: 0.68    RADIOLOGY:  < from: Xray Chest 1 View AP/PA (12.25.18 @ 13:44) >    EXAM:  XR CHEST AP OR PA 1V                          PROCEDURE DATE:  12/25/2018      INTERPRETATION:  Clinical information: Chest pain    Comparison study dated 1/1/2018    Portable exam, 1:30 PM    Cardiac monitor leads present. Cardiac device overlies left mid thorax.   Sternal sutures-mediastinal clips present. Coronary artery stent visible.    Surgical clips present left side of the neck. Tiny surgical clips present   right side of the neck.    Clear lungs. No sign of lobar consolidation vascular congestion or   effusion. Heart size within normal limits. No acute osseous abnormalities.    IMPRESSION:    See above report    CEE ARDON M.D.,ATTENDING RADIOLOGIST  This document has been electronically signed. Dec 25 2018  1:58PM      < end of copied text >    EKG:  NSR, rate 60's with ventricular bigeminy with ST depression in the V leads (chronic, per records)    < from: TTE Echo Doppler w/o Cont (01.02.18 @ 12:17) >     EXAM:  ECHO TTE W/O CON COMP W/DOPPLR         PROCEDURE DATE:  01/02/2018        INTERPRETATION:  Ordering Physician: DARYL A PERLMAN [id number]    Indication: Pulmonary embolism  Technologist Initials: PH  Study Quality: Technically fair   A complete echocardiographic study was performed utilizing standard   protocol including spectral and color Doppler in all echocardiographic   windows.    Height: 185 cm  Weight: 61 kg  BSA: 1.8 sq m  Blood Pressure: 110/54    MEASUREMENTS  IVS: 1.5cm  PWT:1.4cm  LA: 3.9cm  AO: 2.2cm  LVIDd: 4.6cm  LVIDs: 2.9cm    LVEF: 55%    FINDINGS  Left Ventricle: Concentric left ventricular hypertrophy. The endocardium   is not well-visualized. Grossly, there appears to be preserved left   ventricular systolic function. Segmental wall motion suboptimally   assessed.  Aortic Valve: Bioprosthetic aortic valve replacement.  Mitral Valve: Mild mitral annular calcification and calcified mitral   valve leaflets. Mild mitral insufficiency.  Tricuspid Valve: Normal tricuspid valve. Minimal tricuspid insufficiency.  Pulmonic Valve: Not well visualized. Grossly normal.  Left Atrium: Mildly enlarged  Right Ventricle: Normal right ventricular size and systolic function. A   device wire is noted in the right heart.  Right Atrium: Normal  Diastolic Function: Grade 2 diastolic dysfunction.  Pericardium/Pleura: Normal pericardium with no pericardial effusion.    CONNIE VILLANUEVA M.D., ATTENDING CARDIOLOGIST  This document has been electronically signed. Cheko  3 2018  7:13AM      < end of copied text > [facility] - Nadeen Sneed, Dayana, Violeta, Harvey, Curtis Giron  Office Number: [phone]    Initial Consult Note    CHIEF COMPLAINT: Patient is a 86y old  Male who presents with a chief complaint of     HPI: 87 y/o male with Hx og CAD s/p CABG, bioAVR, PE, HTN, HPfEF s/p ICD, and dementia brought to the ED by family from Urgent Care for weakness and productive cough that started yesterday.  In the urgent care clinic, they were not able to obtain pulse oximeter.  Patient unable to give ROS, however, family at bedside states that he has been weak since yesterday and barely able to walk.  Of note, per spouse, patient attempted to urinate this am but unable to.  Called to evaluate patient for elevated troponin (.066), which is lower compared to previous admission (0.98 <--.086).  Pro-BNP=>5000, however, no evidence of volume overload and CxR showed clear lungs.  Family states he has not been taking his Eliquis for several months and his private Cardio, Dr. Bong Meyer ([facility]), ia aware.  Of note, per family, ICD interrogated beginning of December and were told no events.  Per family, patient never had fever, chills, SOB, KELSEY, diarrhea, constipation, or sick contacts.    PAST MEDICAL & SURGICAL HISTORY:  Aortic stenosis  CAD (coronary artery disease)  Anxiety  Temporal arteritis  Dementia  CHF (congestive heart failure)  HTN (hypertension)  Bilateral cataracts  History of prostate surgery  Aortic valve replaced  Cardiac defibrillator in place  H/O hernia repair  History of open heart surgery: triple bypass    SOCIAL HISTORY:  No tobacco, ethanol, or drug abuse.    FAMILY HISTORY:  No pertinent family history in first degree relatives    No family history of acute MI or sudden cardiac death.    MEDICATIONS  (STANDING):    MEDICATIONS  (PRN):    Allergies    No Known Allergies    Intolerances    REVIEW OF SYSTEMS:    CONSTITUTIONAL: No weakness, fevers or chills  EYES/ENT: No visual changes;  No vertigo or throat pain   NECK: No pain or stiffness  RESPIRATORY: No cough, wheezing, hemoptysis; No shortness of breath  CARDIOVASCULAR: No chest pain or palpitations  GASTROINTESTINAL: No abdominal pain. No nausea, vomiting, or hematemesis; No diarrhea or constipation. No melena or hematochezia.  GENITOURINARY: No dysuria, frequency or hematuria  NEUROLOGICAL: No numbness or weakness  SKIN: No itching or rash  All other review of systems is negative unless indicated above    VITAL SIGNS:   Vital Signs Last 24 Hrs  T(C): 36.7 (25 Dec 2018 14:33), Max: 36.7 (25 Dec 2018 14:33)  T(F): 98 (25 Dec 2018 14:33), Max: 98 (25 Dec 2018 14:33)  HR: 69 (25 Dec 2018 14:33) (35 - 72)  BP: 120/30 (25 Dec 2018 14:33) (95/54 - 120/30)  BP(mean): --  RR: 15 (25 Dec 2018 14:33) (15 - 16)  SpO2: 96% (25 Dec 2018 14:33) (95% - 98%)    I&O's Summary    On Exam:    Constitutional: NAD, alert but confused   Lungs:  Non-labored, breath sounds are clear bilaterally, No wheezing, rales or rhonchi  Cardiovascular: RRR.  S1 and S2 positive.  No murmurs, rubs, gallops or clicks  Gastrointestinal: Bowel Sounds present, soft, nontender.   Lymph: No peripheral edema. No cervical lymphadenopathy.  Neurological: Alert, no focal deficits  Skin: No rashes or ulcers   Psych:  Mood & affect flat    LABS: All Labs Reviewed:                        11.4   6.75  )-----------( 116      ( 25 Dec 2018 13:55 )             35.4     25 Dec 2018 13:55    140    |  105    |  20     ----------------------------<  91     4.1     |  29     |  1.40     Ca    8.8        25 Dec 2018 13:55  Mg     2.1       25 Dec 2018 13:55    TPro  6.9    /  Alb  3.3    /  TBili  1.5    /  DBili  x      /  AST  18     /  ALT  15     /  AlkPhos  95     25 Dec 2018 13:55    PT/INR - ( 25 Dec 2018 13:55 )   PT: 14.9 sec;   INR: 1.31 ratio      PTT - ( 25 Dec 2018 13:55 )  PTT:31.6 sec  CARDIAC MARKERS ( 25 Dec 2018 13:55 )  .066 ng/mL / x     / x     / x     / x        Blood Culture:   12-25 @ 13:55  Pro Bnp 5405    12-25 @ 13:55  TSH: 0.68    RADIOLOGY:  < from: Xray Chest 1 View AP/PA (12.25.18 @ 13:44) >    EXAM:  XR CHEST AP OR PA 1V                          PROCEDURE DATE:  12/25/2018      INTERPRETATION:  Clinical information: Chest pain    Comparison study dated 1/1/2018    Portable exam, 1:30 PM    Cardiac monitor leads present. Cardiac device overlies left mid thorax.   Sternal sutures-mediastinal clips present. Coronary artery stent visible.    Surgical clips present left side of the neck. Tiny surgical clips present   right side of the neck.    Clear lungs. No sign of lobar consolidation vascular congestion or   effusion. Heart size within normal limits. No acute osseous abnormalities.    IMPRESSION:    See above report    CEE ARDON M.D.,ATTENDING RADIOLOGIST  This document has been electronically signed. Dec 25 2018  1:58PM      < end of copied text >    EKG:  NSR, rate 60's with ventricular bigeminy with ST depression in the V leads (chronic, per records)    < from: TTE Echo Doppler w/o Cont (01.02.18 @ 12:17) >     EXAM:  ECHO TTE W/O CON COMP W/DOPPLR         PROCEDURE DATE:  01/02/2018        INTERPRETATION:  Ordering Physician: DARYL A PERLMAN [id number]    Indication: Pulmonary embolism  Technologist Initials: PH  Study Quality: Technically fair   A complete echocardiographic study was performed utilizing standard   protocol including spectral and color Doppler in all echocardiographic   windows.    Height: 185 cm  Weight: 61 kg  BSA: 1.8 sq m  Blood Pressure: 110/54    MEASUREMENTS  IVS: 1.5cm  PWT:1.4cm  LA: 3.9cm  AO: 2.2cm  LVIDd: 4.6cm  LVIDs: 2.9cm    LVEF: 55%    FINDINGS  Left Ventricle: Concentric left ventricular hypertrophy. The endocardium   is not well-visualized. Grossly, there appears to be preserved left   ventricular systolic function. Segmental wall motion suboptimally   assessed.  Aortic Valve: Bioprosthetic aortic valve replacement.  Mitral Valve: Mild mitral annular calcification and calcified mitral   valve leaflets. Mild mitral insufficiency.  Tricuspid Valve: Normal tricuspid valve. Minimal tricuspid insufficiency.  Pulmonic Valve: Not well visualized. Grossly normal.  Left Atrium: Mildly enlarged  Right Ventricle: Normal right ventricular size and systolic function. A   device wire is noted in the right heart.  Right Atrium: Normal  Diastolic Function: Grade 2 diastolic dysfunction.  Pericardium/Pleura: Normal pericardium with no pericardial effusion.    CONNIE VILLANUEVA M.D., ATTENDING CARDIOLOGIST  This document has been electronically signed. [place]ko  3 2018  7:13AM      < end of copied text >

## 2018-12-25 NOTE — H&P ADULT - PROBLEM SELECTOR PLAN 2
No evidence of PNA on CXR or CT  Patient not coughing presently  Robitussin prn  Pulmonary consult  Further work-up/management pending clinical course.

## 2018-12-25 NOTE — H&P ADULT - PROBLEM SELECTOR PLAN 3
Pro-BNP=>5000, however, no evidence of volume overload on physical exam and CXR and CT showed clear lungs and no vascular congestion.   No need for diureses at this time

## 2018-12-25 NOTE — CONSULT NOTE ADULT - ASSESSMENT
Assessment/Plan:  86y Male Assessment/Plan:  86y Male with CAD s/p CABG, BioAVR, HFpF s/p ICD, HTN, PE (not compliant with Eliquis), and dementia brought in by family due weakness and productive cough that started yesterday.  Called to evaluate patient for elevated troponin.  Tele shows bigeminal PVC's.    Recommend:    - Admit to telemetry.  Monitor for arrhythmias  - EKG showed NSR with bigeminal PVC's and ST depression in the Vleads, however, chronic.  - Elevated troponin but lower that previous admission.  No evidence of acute cardiac ischemia  - Trend cardiac enzymes until peaks  - Pro BNP=>5000 but no evidence of volume overload  - Called [employer] to interrogate ICD for any events ([employer], model # S185ZGF, Serial# [id number], Protecta XT VR)  - Monitor electrolytes, replete to keep K>4 and Mag>2.  TSH normal  - Continue Plavix, BB, statin, and Imdur  - Continue Eliquis.  Monitor for bleeding  - Follow up CT chest  - Follow up UA    Thank you for the courtesy of this consult  Will continue to follow    Keily Toro NP  Cardiology Assessment/Plan:  86y Male with CAD s/p CABG, BioAVR, HFpF s/p ICD, HTN, PE (not compliant with Eliquis), and dementia brought in by family due weakness and productive cough that started yesterday.  Called to evaluate patient for elevated troponin.  Tele shows bigeminal PVC's.    Recommend:    - Admit to telemetry.  Monitor for arrhythmias  - EKG showed NSR with bigeminal PVC's and ST depressions in the precordial leads, however, chronic.  - Elevated troponin but lower that previous admission.  No evidence of acute cardiac ischemia  - Trend cardiac enzymes until peak  - Pro BNP=>5000 but no evidence of volume overload. CT without significant edema  - Called [employer] to interrogate ICD for any events ([employer], model # X834BTD, Serial# [id number], Protecta XT VR)  - Monitor electrolytes, replete to keep K>4 and Mag>2.  TSH normal  - Continue BB, statin, and Imdur. Please clarify which medications he was on at home; there is documentation that he is off Brilinta, and on Eliquis bid for his history of PE  - Echocardiogram pending  - Will follow with you.    Keily Toro NP  Cardiology Assessment/Plan:  86y Male with CAD s/p CABG, BioAVR, HFpF s/p ICD, HTN, PE (not compliant with Eliquis), and dementia brought in by family due weakness and productive cough that started yesterday.  Called to evaluate patient for elevated troponin.  Tele shows bigeminal PVC's.    Recommend:    - Admit to telemetry.  Monitor for arrhythmias  - EKG showed NSR with bigeminal PVC's and ST depressions in the precordial leads, however, chronic.  - Elevated troponin but lower that previous admission.  No evidence of acute cardiac ischemia. Mildly elevated in setting of lower URI.  - Trend cardiac enzymes until peak  - Pro BNP=>5000 but no evidence of volume overload. CT without significant edema  - Called [employer] to interrogate ICD for any events ([employer], model # V378MMC, Serial# [id number], Protecta XT VR)  - Monitor electrolytes, replete to keep K>4 and Mag>2.  TSH normal  - Continue BB, statin, and Imdur. Please clarify which medications he was on at home; there is documentation that he is off Brilinta, and on Eliquis bid for his history of PE  - Echocardiogram pending  - Will follow with you.    Keily Toro NP  Cardiology

## 2018-12-25 NOTE — ED ADULT NURSE NOTE - PMH
Anxiety    Aortic stenosis    CAD (coronary artery disease)    CHF (congestive heart failure)    Dementia    HTN (hypertension)    Temporal arteritis

## 2018-12-25 NOTE — ED ADULT NURSE NOTE - PSH
Aortic valve replaced    Bilateral cataracts    Cardiac defibrillator in place    H/O hernia repair    History of open heart surgery  triple bypass  History of prostate surgery

## 2018-12-26 ENCOUNTER — TRANSCRIPTION ENCOUNTER (OUTPATIENT)
Age: 83
End: 2018-12-26

## 2018-12-26 VITALS
DIASTOLIC BLOOD PRESSURE: 75 MMHG | HEART RATE: 55 BPM | SYSTOLIC BLOOD PRESSURE: 155 MMHG | RESPIRATION RATE: 18 BRPM | OXYGEN SATURATION: 98 % | TEMPERATURE: 98 F

## 2018-12-26 LAB
ANION GAP SERPL CALC-SCNC: 10 MMOL/L — SIGNIFICANT CHANGE UP (ref 5–17)
BUN SERPL-MCNC: 15 MG/DL — SIGNIFICANT CHANGE UP (ref 7–23)
CALCIUM SERPL-MCNC: 8.8 MG/DL — SIGNIFICANT CHANGE UP (ref 8.5–10.1)
CHLORIDE SERPL-SCNC: 103 MMOL/L — SIGNIFICANT CHANGE UP (ref 96–108)
CHOLEST SERPL-MCNC: 200 MG/DL — HIGH (ref 10–199)
CK SERPL-CCNC: 275 U/L — SIGNIFICANT CHANGE UP (ref 26–308)
CO2 SERPL-SCNC: 26 MMOL/L — SIGNIFICANT CHANGE UP (ref 22–31)
CREAT SERPL-MCNC: 1.3 MG/DL — SIGNIFICANT CHANGE UP (ref 0.5–1.3)
GLUCOSE SERPL-MCNC: 129 MG/DL — HIGH (ref 70–99)
HCT VFR BLD CALC: 37.3 % — LOW (ref 39–50)
HDLC SERPL-MCNC: 71 MG/DL — SIGNIFICANT CHANGE UP
HGB BLD-MCNC: 11.9 G/DL — LOW (ref 13–17)
LIPID PNL WITH DIRECT LDL SERPL: 112 MG/DL — SIGNIFICANT CHANGE UP
MAGNESIUM SERPL-MCNC: 2.1 MG/DL — SIGNIFICANT CHANGE UP (ref 1.6–2.6)
MCHC RBC-ENTMCNC: 27.9 PG — SIGNIFICANT CHANGE UP (ref 27–34)
MCHC RBC-ENTMCNC: 31.9 GM/DL — LOW (ref 32–36)
MCV RBC AUTO: 87.4 FL — SIGNIFICANT CHANGE UP (ref 80–100)
NRBC # BLD: 0 /100 WBCS — SIGNIFICANT CHANGE UP (ref 0–0)
PLATELET # BLD AUTO: 162 K/UL — SIGNIFICANT CHANGE UP (ref 150–400)
POTASSIUM SERPL-MCNC: 3.6 MMOL/L — SIGNIFICANT CHANGE UP (ref 3.5–5.3)
POTASSIUM SERPL-SCNC: 3.6 MMOL/L — SIGNIFICANT CHANGE UP (ref 3.5–5.3)
RBC # BLD: 4.27 M/UL — SIGNIFICANT CHANGE UP (ref 4.2–5.8)
RBC # FLD: 14.4 % — SIGNIFICANT CHANGE UP (ref 10.3–14.5)
SODIUM SERPL-SCNC: 139 MMOL/L — SIGNIFICANT CHANGE UP (ref 135–145)
TOTAL CHOLESTEROL/HDL RATIO MEASUREMENT: 2.8 RATIO — LOW (ref 3.4–9.6)
TRIGL SERPL-MCNC: 87 MG/DL — SIGNIFICANT CHANGE UP (ref 10–149)
TROPONIN I SERPL-MCNC: 0.26 NG/ML — HIGH (ref 0.01–0.04)
WBC # BLD: 8 K/UL — SIGNIFICANT CHANGE UP (ref 3.8–10.5)
WBC # FLD AUTO: 8 K/UL — SIGNIFICANT CHANGE UP (ref 3.8–10.5)

## 2018-12-26 PROCEDURE — G8979: CPT | Mod: CK

## 2018-12-26 PROCEDURE — 80048 BASIC METABOLIC PNL TOTAL CA: CPT

## 2018-12-26 PROCEDURE — 87040 BLOOD CULTURE FOR BACTERIA: CPT

## 2018-12-26 PROCEDURE — 71045 X-RAY EXAM CHEST 1 VIEW: CPT

## 2018-12-26 PROCEDURE — 80061 LIPID PANEL: CPT

## 2018-12-26 PROCEDURE — 85027 COMPLETE CBC AUTOMATED: CPT

## 2018-12-26 PROCEDURE — 85379 FIBRIN DEGRADATION QUANT: CPT

## 2018-12-26 PROCEDURE — 86140 C-REACTIVE PROTEIN: CPT

## 2018-12-26 PROCEDURE — 97162 PT EVAL MOD COMPLEX 30 MIN: CPT

## 2018-12-26 PROCEDURE — G8980: CPT | Mod: CK

## 2018-12-26 PROCEDURE — 82550 ASSAY OF CK (CPK): CPT

## 2018-12-26 PROCEDURE — 36415 COLL VENOUS BLD VENIPUNCTURE: CPT

## 2018-12-26 PROCEDURE — 99222 1ST HOSP IP/OBS MODERATE 55: CPT

## 2018-12-26 PROCEDURE — 84484 ASSAY OF TROPONIN QUANT: CPT

## 2018-12-26 PROCEDURE — 83690 ASSAY OF LIPASE: CPT

## 2018-12-26 PROCEDURE — G8978: CPT | Mod: CK

## 2018-12-26 PROCEDURE — 85610 PROTHROMBIN TIME: CPT

## 2018-12-26 PROCEDURE — 84443 ASSAY THYROID STIM HORMONE: CPT

## 2018-12-26 PROCEDURE — 83605 ASSAY OF LACTIC ACID: CPT

## 2018-12-26 PROCEDURE — 83735 ASSAY OF MAGNESIUM: CPT

## 2018-12-26 PROCEDURE — 93005 ELECTROCARDIOGRAM TRACING: CPT

## 2018-12-26 PROCEDURE — 71275 CT ANGIOGRAPHY CHEST: CPT

## 2018-12-26 PROCEDURE — 99285 EMERGENCY DEPT VISIT HI MDM: CPT | Mod: 25

## 2018-12-26 PROCEDURE — 80053 COMPREHEN METABOLIC PANEL: CPT

## 2018-12-26 PROCEDURE — 85730 THROMBOPLASTIN TIME PARTIAL: CPT

## 2018-12-26 PROCEDURE — 83880 ASSAY OF NATRIURETIC PEPTIDE: CPT

## 2018-12-26 RX ORDER — QUETIAPINE FUMARATE 200 MG/1
0.5 TABLET, FILM COATED ORAL
Qty: 15 | Refills: 0 | OUTPATIENT
Start: 2018-12-26 | End: 2019-01-24

## 2018-12-26 RX ORDER — POTASSIUM CHLORIDE 20 MEQ
40 PACKET (EA) ORAL ONCE
Qty: 0 | Refills: 0 | Status: COMPLETED | OUTPATIENT
Start: 2018-12-26 | End: 2018-12-26

## 2018-12-26 RX ADMIN — Medication 1000 UNIT(S): at 11:46

## 2018-12-26 RX ADMIN — MEMANTINE HYDROCHLORIDE 10 MILLIGRAM(S): 10 TABLET ORAL at 05:13

## 2018-12-26 RX ADMIN — ATENOLOL 25 MILLIGRAM(S): 25 TABLET ORAL at 05:13

## 2018-12-26 RX ADMIN — HEPARIN SODIUM 5000 UNIT(S): 5000 INJECTION INTRAVENOUS; SUBCUTANEOUS at 05:13

## 2018-12-26 RX ADMIN — TICAGRELOR 90 MILLIGRAM(S): 90 TABLET ORAL at 05:13

## 2018-12-26 RX ADMIN — Medication 81 MILLIGRAM(S): at 11:46

## 2018-12-26 RX ADMIN — Medication 40 MILLIEQUIVALENT(S): at 09:41

## 2018-12-26 RX ADMIN — SERTRALINE 50 MILLIGRAM(S): 25 TABLET, FILM COATED ORAL at 11:46

## 2018-12-26 NOTE — PHYSICAL THERAPY INITIAL EVALUATION ADULT - ADDITIONAL COMMENTS
lives with spouse. has 4 rosi with railing  amb with hha of spouse in house and has rw for community use Pt lives in a [place] home /c his significant other of ~29 years. pt has 4 NITA and bilateral handrails. Pt is supervised and assisted for all ambulation and ADLs. Pt ambulates only short distance /c HHAx1. pt does have a rolling walker, shower chair and portable shower head. Pt is never alone and significant other assist him.

## 2018-12-26 NOTE — PHYSICAL THERAPY INITIAL EVALUATION ADULT - TRANSFER SAFETY CONCERNS NOTED: SIT/STAND, REHAB EVAL
decreased balance during turns decreased balance during turns/decreased proprioception/decreased step length/decreased safety awareness/losing balance/decreased sequencing ability/decreased weight-shifting ability

## 2018-12-26 NOTE — DISCHARGE NOTE ADULT - MEDICATION SUMMARY - MEDICATIONS TO TAKE
I will START or STAY ON the medications listed below when I get home from the hospital:    Ecotrin 325 mg oral delayed release tablet  -- 1 tab(s) by mouth once a day  -- Indication: For CAD (coronary artery disease)    Eliquis 5 mg oral tablet  -- 1 tab(s) by mouth 2 times a day  -- Indication: For PE    Zoloft 50 mg oral tablet  -- 1 tab(s) by mouth once a day  -- Indication: For Alzheimer's dementia with behavioral disturbance, unspecified timing of dementia onset    Lipitor 40 mg oral tablet  -- 1 tab(s) by mouth once a day (at bedtime)  -- Indication: For Dyslipidemia    atenolol 25 mg oral tablet  -- 1 tab(s) by mouth once a day  -- Indication: For Essential hypertension    Aricept 10 mg oral tablet  -- 1 tab(s) by mouth once a day (at bedtime)  -- Indication: For Alzheimer's dementia with behavioral disturbance, unspecified timing of dementia onset    Namenda XR 28 mg oral capsule, extended release  -- 1 cap(s) by mouth once a day  -- Indication: For Alzheimer's dementia with behavioral disturbance, unspecified timing of dementia onset    amoxicillin-clavulanate 875 mg-125 mg oral tablet  -- 1 tab(s) by mouth 2 times a day  -- Indication: For bronchitis    Vitamin D3 1000 intl units oral tablet  -- 1 tab(s) by mouth once a day  -- Indication: For Prophylactic measure I will START or STAY ON the medications listed below when I get home from the hospital:    Ecotrin 325 mg oral delayed release tablet  -- 1 tab(s) by mouth once a day  -- Indication: For CAD (coronary artery disease)    Eliquis 5 mg oral tablet  -- 1 tab(s) by mouth 2 times a day  -- Indication: For PE    Zoloft 50 mg oral tablet  -- 1 tab(s) by mouth once a day  -- Indication: For Alzheimer's dementia with behavioral disturbance, unspecified timing of dementia onset    Lipitor 40 mg oral tablet  -- 1 tab(s) by mouth once a day (at bedtime)  -- Indication: For Dyslipidemia    SEROquel 25 mg oral tablet  -- 0.5 tab(s) by mouth once a day (at bedtime)   -- Check with your doctor before becoming pregnant.  Do not drink alcoholic beverages when taking this medication.  May cause drowsiness.  Alcohol may intensify this effect.  Use care when operating dangerous machinery.  Obtain medical advice before taking any non-prescription drugs as some may affect the action of this medication.  This drug may impair the ability to drive or operate machinery.  Use care until you become familiar with its effects.    -- Indication: For Alzheimer's dementia with behavioral disturbance, unspecified timing of dementia onset    atenolol 25 mg oral tablet  -- 1 tab(s) by mouth once a day  -- Indication: For Essential hypertension    Aricept 10 mg oral tablet  -- 1 tab(s) by mouth once a day (at bedtime)  -- Indication: For Alzheimer's dementia with behavioral disturbance, unspecified timing of dementia onset    Namenda XR 28 mg oral capsule, extended release  -- 1 cap(s) by mouth once a day  -- Indication: For Alzheimer's dementia with behavioral disturbance, unspecified timing of dementia onset    amoxicillin-clavulanate 875 mg-125 mg oral tablet  -- 1 tab(s) by mouth 2 times a day  -- Indication: For bronchitis    Vitamin D3 1000 intl units oral tablet  -- 1 tab(s) by mouth once a day  -- Indication: For Prophylactic measure

## 2018-12-26 NOTE — PHYSICAL THERAPY INITIAL EVALUATION ADULT - RANGE OF MOTION EXAMINATION, REHAB EVAL
no ROM deficits were identified Arthritic changes noted/bilateral lower extremity ROM was WFL (within functional limits)/deficits as listed below/bilateral upper extremity ROM was WFL (within functional limits)

## 2018-12-26 NOTE — DISCHARGE NOTE ADULT - PATIENT PORTAL LINK FT
You can access the [facility] Patient Portal, offered by [facility], by registering with the following website: [email or web address]

## 2018-12-26 NOTE — PHYSICAL THERAPY INITIAL EVALUATION ADULT - PERTINENT HX OF CURRENT PROBLEM, REHAB EVAL
pt with +  PE, SOB As per H&P:"87 y/o male with Hx og CAD s/p CABG, bioAVR, PE, HTN, HPfEF s/p ICD, and dementia brought to the ED by family from Urgent Care for weakness and productive cough that started yesterday.  In the urgent care clinic, they were not able to obtain pulse oximeter.  Patient unable to give ROS, however, family at bedside states that he has been weak since yesterday and barely able to walk but actually slightly better today."

## 2018-12-26 NOTE — PHYSICAL THERAPY INITIAL EVALUATION ADULT - GENERAL OBSERVATIONS, REHAB EVAL
pt resting in bed with dementia, pt spouse present Pt rec'd in tele bed /c significant other present and per significant other, pt was getting restless due to wanting to go home. Pt was calm & cooperative /c PT evaluation.

## 2018-12-26 NOTE — PROGRESS NOTE ADULT - PROBLEM SELECTOR PLAN 7
Continue aspirin   Restart brilinta  Continue beta-blocker and statin  Hold imdur for now (d/c'd 1 year ago)

## 2018-12-26 NOTE — PHYSICAL THERAPY INITIAL EVALUATION ADULT - WEIGHT-BEARING RESTRICTIONS: STAND/SIT, REHAB EVAL
weight-bearing as tolerated tactile, verbal and manual cues needed for all mobility/weight-bearing as tolerated

## 2018-12-26 NOTE — CONSULT NOTE ADULT - PROBLEM SELECTOR RECOMMENDATION 9
dementia  valv heart disease  HFpEF  CT chest shows patchy airspace disease - will start Augmentin BID for 7 days for poss Lower resp tract infection,   cvs regimen  monitor vs and HD and Sat, keep sat > 88 pct  am labs pending  robitussin PRN for cough  will follow  imaging and labs reviewed

## 2018-12-26 NOTE — DISCHARGE NOTE ADULT - HOME CARE AGENCY
[facility] -Visiting RN / Physical Therapy   [phone] Michelle RUIZ Visiting RN / Physical Therapy  [phone]

## 2018-12-26 NOTE — PROGRESS NOTE ADULT - SUBJECTIVE AND OBJECTIVE BOX
Patient is a 86y old  Male who presents with a chief complaint of cough (26 Dec 2018 07:25)      INTERVAL HPI/OVERNIGHT EVENTS: Patient seen and examined. NAD. No complaints.    Vital Signs Last 24 Hrs  T(C): 36.7 (26 Dec 2018 08:15), Max: 37 (25 Dec 2018 18:15)  T(F): 98.1 (26 Dec 2018 08:15), Max: 98.6 (25 Dec 2018 18:15)  HR: 69 (26 Dec 2018 08:15) (35 - 94)  BP: 163/72 (26 Dec 2018 08:15) (95/54 - 172/69)  BP(mean): --  RR: 18 (26 Dec 2018 08:15) (15 - 18)  SpO2: 99% (26 Dec 2018 08:15) (93% - 99%)    12-26    139  |  103  |  15  ----------------------------<  129<H>  3.6   |  26  |  1.30    Ca    8.8      26 Dec 2018 06:41  Mg     2.1     12-26    TPro  6.9  /  Alb  3.3  /  TBili  1.5<H>  /  DBili  x   /  AST  18  /  ALT  15  /  AlkPhos  95  12-25                          11.9   8.00  )-----------( 162      ( 26 Dec 2018 06:41 )             37.3     PT/INR - ( 25 Dec 2018 13:55 )   PT: 14.9 sec;   INR: 1.31 ratio         PTT - ( 25 Dec 2018 13:55 )  PTT:31.6 sec  CAPILLARY BLOOD GLUCOSE                  amoxicillin  875 milliGRAM(s)/clavulanate 1 Tablet(s) Oral two times a day  aspirin enteric coated 81 milliGRAM(s) Oral daily  ATENolol  Tablet 25 milliGRAM(s) Oral daily  atorvastatin 40 milliGRAM(s) Oral at bedtime  cholecalciferol 1000 Unit(s) Oral daily  donepezil 10 milliGRAM(s) Oral at bedtime  guaiFENesin    Syrup 100 milliGRAM(s) Oral every 6 hours PRN  haloperidol     Tablet 0.5 milliGRAM(s) Oral every 6 hours PRN  heparin  Injectable 5000 Unit(s) SubCutaneous every 12 hours  memantine 10 milliGRAM(s) Oral two times a day  sertraline 50 milliGRAM(s) Oral daily  ticagrelor 90 milliGRAM(s) Oral two times a day              REVIEW OF SYSTEMS:  CONSTITUTIONAL: No fever, no weight loss, or no fatigue  NECK: No pain, no stiffness  RESPIRATORY: No cough, no wheezing, no chills, no hemoptysis, No shortness of breath  CARDIOVASCULAR: No chest pain, no palpitations, no dizziness, no leg swelling  GASTROINTESTINAL: No abdominal pain. No nausea, no vomiting, no hematemesis; No diarrhea, no constipation. No melena, no hematochezia.  GENITOURINARY: No dysuria, no frequency, no hematuria, no incontinence  NEUROLOGICAL: No headaches, no loss of strength, no numbness, no tremors  SKIN: No itching, no burning  MUSCULOSKELETAL: No joint pain, no swelling; No muscle, no back, no extremity pain  PSYCHIATRIC: No depression, no mood swings,   HEME/LYMPH: No easy bruising, no bleeding gums  ALLERY AND IMMUNOLOGIC: No hives       Consultant(s) Notes Reviewed:  [X] YES  [ ] NO    PHYSICAL EXAM:  GENERAL: NAD  HEAD:  Atraumatic, Normocephalic  EYES: EOMI, PERRLA, conjunctiva and sclera clear  ENMT: No tonsillar erythema, exudates, or enlargement; Moist mucous membranes  NECK: Supple, No JVD  NERVOUS SYSTEM:  Awake & alert  CHEST/LUNG: Clear to auscultation bilaterally; No rales, rhonchi, wheezing,  HEART: Regular rate and rhythm  ABDOMEN: Soft, Nontender, Nondistended; Bowel sounds present  EXTREMITIES:  No clubbing, cyanosis, or edema  LYMPH: No lymphadenopathy noted  SKIN: No rashes      Advanced care planning discussed with patient/family [X] YES   [ ] NO    Advanced care planning discussed with patient/family. Advanced care planning forms reviewed/discussed/completed. 20 minutes spent.

## 2018-12-26 NOTE — DISCHARGE NOTE ADULT - CARE PLAN
Principal Discharge DX:	Bronchitis  Goal:	.  Assessment and plan of treatment:	Finish course of antibiotics.  Follow-up with your primary care doctor within 1 week.

## 2018-12-26 NOTE — CONSULT NOTE ADULT - SUBJECTIVE AND OBJECTIVE BOX
Date/Time Patient Seen:  		  Referring MD:   Data Reviewed	       Patient is a 86y old  Male who presents with a chief complaint of cough (25 Dec 2018 16:28)      Subjective/HPI    seen and examined  vs and meds reviewed    in bed  on room air  H and P reviewed  ER provider note reviewed    CT chest reviewed    cough  occ SOB    TTE reviewed     History of Present Illness:  Reason for Admission: cough  History of Present Illness:   85 y/o male with Hx og CAD s/p CABG, bioAVR, PE, HTN, HPfEF s/p ICD, and dementia brought to the ED by family from Urgent Care for weakness and productive cough that started yesterday.  In the urgent care clinic, they were not able to obtain pulse oximeter.  Patient unable to give ROS, however, family at bedside states that he has been weak since yesterday and barely able to walk but actually slightly better today.  Patient has not been taking his eliquis nor any of his other meds for several months. Family says he spits out all his meds so they stopped giving them. Per family, patient never had fever, chills, SOB, KELSEY, diarrhea, constipation, or sick contacts.    Of note: After he was diagnosed with PE on 1/1/18 and started on Eliquis, then his cardiologist stopped his Isosorbide 120 bid, lasix 40 qd, and Brilinta 90 bid    Per family, ICD interrogated beginning of December and were told no events.       PAST MEDICAL & SURGICAL HISTORY:  Aortic stenosis  CAD (coronary artery disease)  Anxiety  Temporal arteritis  Dementia  CHF (congestive heart failure)  HTN (hypertension)  Bilateral cataracts  History of prostate surgery  Aortic valve replaced  Cardiac defibrillator in place  H/O hernia repair  History of open heart surgery: triple bypass        Medication list         MEDICATIONS  (STANDING):  aspirin enteric coated 81 milliGRAM(s) Oral daily  ATENolol  Tablet 25 milliGRAM(s) Oral daily  atorvastatin 40 milliGRAM(s) Oral at bedtime  cholecalciferol 1000 Unit(s) Oral daily  donepezil 10 milliGRAM(s) Oral at bedtime  heparin  Injectable 5000 Unit(s) SubCutaneous every 12 hours  memantine 10 milliGRAM(s) Oral two times a day  potassium chloride    Tablet ER 40 milliEquivalent(s) Oral once  sertraline 50 milliGRAM(s) Oral daily  sodium chloride 0.9%. 1000 milliLiter(s) (50 mL/Hr) IV Continuous <Continuous>  ticagrelor 90 milliGRAM(s) Oral two times a day    MEDICATIONS  (PRN):  guaiFENesin    Syrup 100 milliGRAM(s) Oral every 6 hours PRN Cough  haloperidol     Tablet 0.5 milliGRAM(s) Oral every 6 hours PRN agitiation         Vitals log        ICU Vital Signs Last 24 Hrs  T(C): 36.9 (26 Dec 2018 05:16), Max: 37 (25 Dec 2018 18:15)  T(F): 98.5 (26 Dec 2018 05:16), Max: 98.6 (25 Dec 2018 18:15)  HR: 85 (26 Dec 2018 05:16) (35 - 94)  BP: 167/75 (26 Dec 2018 05:16) (95/54 - 172/69)  BP(mean): --  ABP: --  ABP(mean): --  RR: 18 (26 Dec 2018 05:16) (15 - 18)  SpO2: 97% (26 Dec 2018 05:16) (93% - 99%)           Input and Output:  I&O's Detail    25 Dec 2018 07:01  -  26 Dec 2018 07:00  --------------------------------------------------------  IN:    sodium chloride 0.9%.: 550 mL  Total IN: 550 mL    OUT:  Total OUT: 0 mL    Total NET: 550 mL          Lab Data                        11.9   8.00  )-----------( 162      ( 26 Dec 2018 06:41 )             37.3     12-26    139  |  103  |  15  ----------------------------<  129<H>  3.6   |  26  |  1.30    Ca    8.8      26 Dec 2018 06:41  Mg     2.1     12-26    TPro  6.9  /  Alb  3.3  /  TBili  1.5<H>  /  DBili  x   /  AST  18  /  ALT  15  /  AlkPhos  95  12-25      CARDIAC MARKERS ( 26 Dec 2018 06:41 )  .180 ng/mL / x     / 268 U/L / x     / x      CARDIAC MARKERS ( 25 Dec 2018 21:08 )  .061 ng/mL / x     / 204 U/L / x     / x      CARDIAC MARKERS ( 25 Dec 2018 13:55 )  .066 ng/mL / x     / x     / x     / x            Review of Systems	      Objective     Physical Examination    heart s1s2  lung dec BS  abd soft  cn grossly int      Pertinent Lab findings & Imaging      Monse:  NO   Adequate UO     I&O's Detail    25 Dec 2018 07:01  -  26 Dec 2018 07:00  --------------------------------------------------------  IN:    sodium chloride 0.9%.: 550 mL  Total IN: 550 mL    OUT:  Total OUT: 0 mL    Total NET: 550 mL               Discussed with:     Cultures:	        Radiology

## 2018-12-26 NOTE — DISCHARGE NOTE ADULT - CARE PROVIDER_API CALL
Jadon Muir), Internal Medicine  [address] [place] [address]  Phone: [phone]  Fax: [phone]

## 2018-12-26 NOTE — PHYSICAL THERAPY INITIAL EVALUATION ADULT - IMPAIRMENTS FOUND, PT EVAL
arousal, attention, and cognition/fine motor/tone/gait, locomotion, and balance/joint integrity and mobility/gross motor/poor safety awareness/aerobic capacity/endurance/cognitive impairment/muscle strength/posture

## 2018-12-26 NOTE — DISCHARGE NOTE ADULT - HOSPITAL COURSE
87 y/o male with Hx og CAD s/p CABG, bioAVR, PE, HTN, HPfEF s/p ICD, and dementia brought to the ED by family from Urgent Care for weakness and productive cough that started yesterday.  In the urgent care clinic, they were not able to obtain pulse oximeter.  Patient unable to give ROS, however, family at bedside states that he has been weak since yesterday and barely able to walk but actually slightly better today.  Patient has not been taking his eliquis nor any of his other meds for several months. Family says he spits out all his meds so they stopped giving them. Per family, patient never had fever, chills, SOB, KELSEY, diarrhea, constipation, or sick contacts.    Of note: After he was diagnosed with PE on 1/1/18 and started on Eliquis, then his cardiologist stopped his Isosorbide 120 bid, lasix 40 qd, and Brilinta 90 bid    Per family, ICD interrogated beginning of December and were told no events.      Patient had slight bump in troponin. No evidence of ACS  Cleared by cardio for d/c home  CTA no PE, possible LRTI  PO augmentin x 7    >30 minutes spent on discharge

## 2018-12-26 NOTE — PROGRESS NOTE ADULT - PROBLEM SELECTOR PLAN 1
Cleared by cardio for d/c home  Patient found to have elevated troponin (.066), which is lower compared to previous admission (0.98 <--.086).    Doubt ACS  Trend enzymes  Cardio consult  Further work-up/management pending clinical course.

## 2018-12-26 NOTE — PHYSICAL THERAPY INITIAL EVALUATION ADULT - IMPAIRED TRANSFERS: SIT/STAND, REHAB EVAL
impaired postural control/impaired coordination/decreased flexibility/abnormal muscle tone/narrow base of support/decreased strength/impaired balance

## 2018-12-26 NOTE — PHYSICAL THERAPY INITIAL EVALUATION ADULT - IMPAIRMENTS CONTRIBUTING TO GAIT DEVIATIONS, PT EVAL
impaired coordination/abnormal muscle tone/impaired balance/impaired motor control/narrow base of support/impaired postural control/decreased flexibility/decreased strength

## 2018-12-26 NOTE — PHYSICAL THERAPY INITIAL EVALUATION ADULT - BALANCE DISTURBANCE, IDENTIFIED IMPAIRMENT CONTRIBUTE, REHAB EVAL
decreased strength/impaired postural control/impaired coordination/impaired motor control/abnormal muscle tone

## 2018-12-26 NOTE — PROGRESS NOTE ADULT - PROBLEM SELECTOR PLAN 9
Patient with h/o PE  No evidence of PE on CTA -- will not restart eliquis (almost 1 year since PE)  DVT prophylaxis only

## 2018-12-26 NOTE — PHYSICAL THERAPY INITIAL EVALUATION ADULT - GAIT DEVIATIONS NOTED, PT EVAL
decreased pritesh decreased weight-shifting ability/decreased step length/decreased pritesh/decreased velocity of limb motion/NBOS, shuffles, decreased weight shifting on RW/decreased stride length

## 2018-12-26 NOTE — PHYSICAL THERAPY INITIAL EVALUATION ADULT - DID THE PATIENT HAVE SURGERY?
n/a n/a/C-reactive protein: 11.08; Troponin: .180; CT Angio chest: (-) PE, (-) pleural effusion, (-) thoracic aortic aneurysm, (+) airspace disease in left lower lobe; chest X-ray; clear lungs

## 2018-12-30 LAB
CULTURE RESULTS: SIGNIFICANT CHANGE UP
CULTURE RESULTS: SIGNIFICANT CHANGE UP
SPECIMEN SOURCE: SIGNIFICANT CHANGE UP
SPECIMEN SOURCE: SIGNIFICANT CHANGE UP

## 2019-07-22 ENCOUNTER — EMERGENCY (EMERGENCY)
Facility: HOSPITAL | Age: 84
LOS: 1 days | Discharge: HOME | End: 2019-07-22
Attending: EMERGENCY MEDICINE | Admitting: EMERGENCY MEDICINE
Payer: MEDICARE

## 2019-07-22 VITALS
WEIGHT: 128.09 LBS | HEIGHT: 63 IN | OXYGEN SATURATION: 99 % | DIASTOLIC BLOOD PRESSURE: 100 MMHG | SYSTOLIC BLOOD PRESSURE: 199 MMHG | RESPIRATION RATE: 18 BRPM | HEART RATE: 90 BPM | TEMPERATURE: 98 F

## 2019-07-22 VITALS
SYSTOLIC BLOOD PRESSURE: 154 MMHG | OXYGEN SATURATION: 99 % | TEMPERATURE: 98 F | DIASTOLIC BLOOD PRESSURE: 63 MMHG | HEART RATE: 67 BPM | RESPIRATION RATE: 16 BRPM

## 2019-07-22 DIAGNOSIS — Z95.2 PRESENCE OF PROSTHETIC HEART VALVE: Chronic | ICD-10-CM

## 2019-07-22 DIAGNOSIS — Z98.890 OTHER SPECIFIED POSTPROCEDURAL STATES: Chronic | ICD-10-CM

## 2019-07-22 DIAGNOSIS — H26.9 UNSPECIFIED CATARACT: Chronic | ICD-10-CM

## 2019-07-22 DIAGNOSIS — Z95.810 PRESENCE OF AUTOMATIC (IMPLANTABLE) CARDIAC DEFIBRILLATOR: Chronic | ICD-10-CM

## 2019-07-22 PROCEDURE — 12011 RPR F/E/E/N/L/M 2.5 CM/<: CPT

## 2019-07-22 PROCEDURE — 70450 CT HEAD/BRAIN W/O DYE: CPT | Mod: 26

## 2019-07-22 PROCEDURE — 70486 CT MAXILLOFACIAL W/O DYE: CPT | Mod: 26

## 2019-07-22 PROCEDURE — 99284 EMERGENCY DEPT VISIT MOD MDM: CPT

## 2019-07-22 PROCEDURE — 99284 EMERGENCY DEPT VISIT MOD MDM: CPT | Mod: 25

## 2019-07-22 PROCEDURE — 70486 CT MAXILLOFACIAL W/O DYE: CPT

## 2019-07-22 PROCEDURE — 70450 CT HEAD/BRAIN W/O DYE: CPT

## 2019-07-22 NOTE — ED ADULT TRIAGE NOTE - CHIEF COMPLAINT QUOTE
Pt. complaining of fall from bed, witnessed by significant other, Per significant other no LOC. Patient with laceration and swelling to right eye.

## 2019-07-22 NOTE — ED ADULT NURSE NOTE - OBJECTIVE STATEMENT
received pt  with no distress s/p fall at home pt reavaluated and laceration noted over rt eyebrow area cleaned and pt taken to ct scan awaiting

## 2019-07-22 NOTE — ED PROVIDER NOTE - OBJECTIVE STATEMENT
87yo male who presents s/p fall out of bed. wife states pt fell out of bed and hit the corner of the nightstand, no LOC< pt has dementia so hx is limited, no dizziness no headache no neck pain

## 2020-01-15 NOTE — ED PROVIDER NOTE - OBJECTIVE STATEMENT
PMD dr morrison, 85M with PMH of CAD s/p CABG, CHF (EF?), HTN, aortic stenosis s/p replacement, dementia . History given by family who is at bedside as pt is poor historian. hx of pe not compliant with eliquis or other per family went to urgent care for cough they were unable to assess pt's hr or pulseox so he was sent here emergently by craig for eval Plan: Pt will apply thin layer to affected area for 3 weeks and follow up in 3 weeks Initiate Treatment: Hydrocortisone ointment 2.5% Detail Level: Zone

## 2020-09-01 NOTE — ED ADULT NURSE NOTE - NS ED NURSE LEVEL OF CONSCIOUSNESS ORIENTATION
Spoke with patient and he stated that he was aware that Dr. Galvan placed the referral for psychiatry because someone from there had called to schedule him and he missed it. I informed him that he should try to call them back and leave a voicemail to reschedule. I asked him to please remind Dr. Galvan why he wanted the referral to urology. He stated he would rather not discuss it and doesn't need a referral after all.   Disoriented

## 2022-02-07 NOTE — PROGRESS NOTE ADULT - PROBLEM/PLAN-6
From: Soham Saul  To: Radha Andino  Sent: 2/7/2022 9:18 AM CST  Subject: Referral    I was wondering if I could get a referral to the [facility] to see Keyona I saw her years ago but they said native referral to see her   DISPLAY PLAN FREE TEXT

## 2022-09-01 NOTE — DISCHARGE NOTE ADULT - NSFTFSERV2RD_GEN_ALL_CORE
Informed pt wife that there will be a virtual  available for his OV. She voiced understanding.    Physical Therapy

## 2023-06-20 NOTE — PHYSICAL THERAPY INITIAL EVALUATION ADULT - BALANCE TRAINING, PT EVAL
Detail Level: Detailed Detail Level: Zone Detail Level: Generalized Goals 3-5 days, Pt will improve static and dynamic balance /c rolling walker to [facility]

## 2023-12-05 NOTE — PROGRESS NOTE ADULT - PROBLEM SELECTOR PROBLEM 9
TONI PHILLIPS    
Need for prophylactic measure

## 2024-01-19 NOTE — ED ADULT NURSE NOTE - NS ED NURSE LEVEL OF CONSCIOUSNESS SPEECH
Speaking Coherently [Normal Gait] : normal gait [No Edema] : no edema [Normal] : alert and oriented, normal memory

## 2024-12-26 NOTE — ED PROVIDER NOTE - NS ED MD EM SELECTION
Ambulatory Care Coordination Note     12/26/2024 10:32 AM     patient outreach attempt by this ACM today to perform care management follow up . ACM was unable to reach the patient by telephone today;   left voice message requesting a return phone call to this ACM.     Patient closed (patient disengaged) from the High Risk Care Management program on 12/26/2024.          [id number] Comprehensive

## 2024-12-27 NOTE — DISCHARGE NOTE ADULT - NSFTFATTESTRESTRICTRD_GEN_ALL_CORE
Lab Results   Component Value Date    EGFR 11 12/24/2024    EGFR 13 08/13/2024    EGFR 13 04/04/2024    CREATININE 5.39 (H) 12/24/2024    CREATININE 4.64 (H) 08/13/2024    CREATININE 4.56 (H) 04/04/2024       Orders:    Basic metabolic panel; Future    CBC and differential; Future    Basic metabolic panel; Future    Urinalysis with microscopic; Future    Albumin / creatinine urine ratio; Future    Phosphorus; Future    Uric acid; Future    PTH, intact; Future    Vitamin D 25 hydroxy; Future    After review of the medical record, CKD slowly progressive with recent creatinine levels fluctuating around 4.0 - 4.4. Etiology of CKD multifactorial and suspected secondary to diabetic nephropathy, hypertensive nephrosclerosis, and nephrotic range proteinuria.      He attended [facility] class in the past and urgently preferred in home hemodialysis.  He reports to be that in the interim he and his wife had a discussion and he would prefer to go in center for his hemodialysis treatments.      He was referred to [facility] and underwent placement of left arm AVF on 4/10/2024.  He underwent follow-up dialysis access evaluation on 5/16/2024 which noted the brachiocephalic dialysis AV fistula appeared patent and mature.  No follow-up no documented from Dr. Nielsen.  Good thrill and bruit noted on examination today.     He was referred to Dr. MARIANELA Benedict for renal transplant evaluation through [facility].     In the interim, chronic kidney disease has been progressive with current creatinine level of 5.39 and eGFR of 11. No evidence of hyperkalemia patient is without uremic symptoms today.     Will continue to closely monitor renal function and repeat BMP in 1 month.    ATTENDING CERTIFICATION

## 2025-03-14 NOTE — SWALLOW BEDSIDE ASSESSMENT ADULT - ASPIRATION PRECAUTIONS
----- Message from Alok Dye MD sent at 3/14/2025  8:39 AM CDT -----  Normal next step endo  
Results and recommendations viewed by Patient via [facility].    No further action needed.      Future Appointments   Date Time Provider Department Center   8/15/2025  3:00 PM Parminder Nj MD [facility]      
yes

## 2025-04-03 NOTE — ED PROVIDER NOTE - PMH
Anxiety    Aortic stenosis    CAD (coronary artery disease)    CHF (congestive heart failure)    Dementia    HTN (hypertension)    Temporal arteritis 106

## 2025-07-11 NOTE — PATIENT PROFILE ADULT - NSPROALCOHOLUSE2_GEN_A_NUR
Pt with recent kidney stones w/ stent and stent removal. Pt with returning abdominal pain and possible stones. Will obtain labs, CT, renal cholic workup and pain medication. never